# Patient Record
Sex: FEMALE | Race: WHITE | Employment: FULL TIME | URBAN - NONMETROPOLITAN AREA
[De-identification: names, ages, dates, MRNs, and addresses within clinical notes are randomized per-mention and may not be internally consistent; named-entity substitution may affect disease eponyms.]

---

## 2017-05-12 ENCOUNTER — HOSPITAL ENCOUNTER (OUTPATIENT)
Age: 57
Discharge: HOME | End: 2017-05-12
Payer: COMMERCIAL

## 2017-05-12 DIAGNOSIS — E78.49 OTHER HYPERLIPIDEMIA: ICD-10-CM

## 2017-05-12 DIAGNOSIS — R73.9 HYPERGLYCEMIA: ICD-10-CM

## 2017-05-12 LAB
ALT SERPL-CCNC: 15 U/L (ref 5–33)
ANION GAP SERPL CALCULATED.3IONS-SCNC: 11 MMOL/L (ref 9–17)
AST SERPL-CCNC: 16 U/L
BUN BLDV-MCNC: 21 MG/DL (ref 6–20)
BUN/CREAT BLD: 22 (ref 9–20)
CALCIUM SERPL-MCNC: 9.9 MG/DL (ref 8.6–10.4)
CHLORIDE BLD-SCNC: 101 MMOL/L (ref 98–107)
CHOLESTEROL/HDL RATIO: 3.4
CHOLESTEROL: 162 MG/DL
CO2: 28 MMOL/L (ref 20–31)
CREAT SERPL-MCNC: 0.95 MG/DL (ref 0.5–0.9)
ESTIMATED AVERAGE GLUCOSE: 114 MG/DL
GFR AFRICAN AMERICAN: >60 ML/MIN
GFR NON-AFRICAN AMERICAN: >60 ML/MIN
GFR SERPL CREATININE-BSD FRML MDRD: ABNORMAL ML/MIN/{1.73_M2}
GFR SERPL CREATININE-BSD FRML MDRD: ABNORMAL ML/MIN/{1.73_M2}
GLUCOSE BLD-MCNC: 111 MG/DL (ref 70–99)
HBA1C MFR BLD: 5.6 % (ref 4.8–5.9)
HCT VFR BLD CALC: 42.7 % (ref 36–46)
HDLC SERPL-MCNC: 48 MG/DL
HEMOGLOBIN: 14.5 G/DL (ref 12–16)
HIGH SENSITIVE C-REACTIVE PROTEIN: 0.5 MG/L
LDL CHOLESTEROL: 95 MG/DL (ref 0–130)
MCH RBC QN AUTO: 30.6 PG (ref 26–34)
MCHC RBC AUTO-ENTMCNC: 34 G/DL (ref 31–37)
MCV RBC AUTO: 90 FL (ref 80–100)
PDW BLD-RTO: 12.5 % (ref 12.1–15.2)
PLATELET # BLD: 190 K/UL (ref 140–450)
PMV BLD AUTO: NORMAL FL (ref 6–12)
POTASSIUM SERPL-SCNC: 4.3 MMOL/L (ref 3.7–5.3)
RBC # BLD: 4.75 M/UL (ref 4–5.2)
SODIUM BLD-SCNC: 140 MMOL/L (ref 135–144)
TRIGL SERPL-MCNC: 93 MG/DL
VLDLC SERPL CALC-MCNC: NORMAL MG/DL (ref 1–30)
WBC # BLD: 4.9 K/UL (ref 3.5–11)

## 2017-05-12 PROCEDURE — 80048 BASIC METABOLIC PNL TOTAL CA: CPT

## 2017-05-12 PROCEDURE — 86141 C-REACTIVE PROTEIN HS: CPT

## 2017-05-12 PROCEDURE — 80061 LIPID PANEL: CPT

## 2017-05-12 PROCEDURE — 83036 HEMOGLOBIN GLYCOSYLATED A1C: CPT

## 2017-05-12 PROCEDURE — 84450 TRANSFERASE (AST) (SGOT): CPT

## 2017-05-12 PROCEDURE — 85027 COMPLETE CBC AUTOMATED: CPT

## 2017-05-12 PROCEDURE — 36415 COLL VENOUS BLD VENIPUNCTURE: CPT

## 2017-05-12 PROCEDURE — 84460 ALANINE AMINO (ALT) (SGPT): CPT

## 2017-05-19 PROBLEM — M17.12 OSTEOARTHRITIS OF LEFT KNEE: Status: ACTIVE | Noted: 2017-05-19

## 2017-05-19 PROBLEM — I10 ESSENTIAL HYPERTENSION: Status: ACTIVE | Noted: 2017-05-19

## 2017-05-19 PROBLEM — B35.1 ONYCHOMYCOSIS: Status: ACTIVE | Noted: 2017-05-19

## 2017-11-22 ENCOUNTER — HOSPITAL ENCOUNTER (OUTPATIENT)
Age: 57
Discharge: HOME | End: 2017-11-22
Payer: COMMERCIAL

## 2017-11-22 DIAGNOSIS — E78.5 HYPERLIPIDEMIA, UNSPECIFIED: ICD-10-CM

## 2017-11-22 DIAGNOSIS — R73.9 HYPERGLYCEMIA: ICD-10-CM

## 2017-11-22 LAB
ANION GAP SERPL CALCULATED.3IONS-SCNC: 11 MMOL/L (ref 9–17)
BUN BLDV-MCNC: 20 MG/DL (ref 6–20)
BUN/CREAT BLD: 17 (ref 9–20)
CALCIUM SERPL-MCNC: 9.9 MG/DL (ref 8.6–10.4)
CHLORIDE BLD-SCNC: 104 MMOL/L (ref 98–107)
CHOLESTEROL/HDL RATIO: 3.9
CHOLESTEROL: 217 MG/DL
CO2: 28 MMOL/L (ref 20–31)
CREAT SERPL-MCNC: 1.16 MG/DL (ref 0.5–0.9)
CREATININE URINE: 154.6 MG/DL (ref 28–217)
ESTIMATED AVERAGE GLUCOSE: 105 MG/DL
GFR AFRICAN AMERICAN: 58 ML/MIN
GFR NON-AFRICAN AMERICAN: 48 ML/MIN
GFR SERPL CREATININE-BSD FRML MDRD: ABNORMAL ML/MIN/{1.73_M2}
GFR SERPL CREATININE-BSD FRML MDRD: ABNORMAL ML/MIN/{1.73_M2}
GLUCOSE BLD-MCNC: 119 MG/DL (ref 70–99)
HBA1C MFR BLD: 5.3 % (ref 4.8–5.9)
HCT VFR BLD CALC: 43.5 % (ref 36–46)
HDLC SERPL-MCNC: 56 MG/DL
HEMOGLOBIN: 14.8 G/DL (ref 12–16)
HIGH SENSITIVE C-REACTIVE PROTEIN: 0.6 MG/L
LDL CHOLESTEROL: 137 MG/DL (ref 0–130)
MCH RBC QN AUTO: 30.7 PG (ref 26–34)
MCHC RBC AUTO-ENTMCNC: 33.9 G/DL (ref 31–37)
MCV RBC AUTO: 90.4 FL (ref 80–100)
MICROALBUMIN/CREAT 24H UR: 31 MG/L
MICROALBUMIN/CREAT UR-RTO: 20 MCG/MG CREAT
PDW BLD-RTO: 12.4 % (ref 12.1–15.2)
PLATELET # BLD: 200 K/UL (ref 140–450)
PMV BLD AUTO: 9.3 FL (ref 6–12)
POTASSIUM SERPL-SCNC: 4.5 MMOL/L (ref 3.7–5.3)
RBC # BLD: 4.82 M/UL (ref 4–5.2)
SODIUM BLD-SCNC: 143 MMOL/L (ref 135–144)
TRIGL SERPL-MCNC: 121 MG/DL
VLDLC SERPL CALC-MCNC: ABNORMAL MG/DL (ref 1–30)
WBC # BLD: 5.6 K/UL (ref 3.5–11)

## 2017-11-22 PROCEDURE — 82570 ASSAY OF URINE CREATININE: CPT

## 2017-11-22 PROCEDURE — 82043 UR ALBUMIN QUANTITATIVE: CPT

## 2017-11-22 PROCEDURE — 85027 COMPLETE CBC AUTOMATED: CPT

## 2017-11-22 PROCEDURE — 86141 C-REACTIVE PROTEIN HS: CPT

## 2017-11-22 PROCEDURE — 83036 HEMOGLOBIN GLYCOSYLATED A1C: CPT

## 2017-11-22 PROCEDURE — 36415 COLL VENOUS BLD VENIPUNCTURE: CPT

## 2017-11-22 PROCEDURE — 80061 LIPID PANEL: CPT

## 2017-11-22 PROCEDURE — 80048 BASIC METABOLIC PNL TOTAL CA: CPT

## 2017-11-28 ENCOUNTER — OFFICE VISIT (OUTPATIENT)
Dept: FAMILY MEDICINE CLINIC | Age: 57
End: 2017-11-28
Payer: COMMERCIAL

## 2017-11-28 VITALS — DIASTOLIC BLOOD PRESSURE: 76 MMHG | WEIGHT: 183.8 LBS | BODY MASS INDEX: 29.67 KG/M2 | SYSTOLIC BLOOD PRESSURE: 140 MMHG

## 2017-11-28 DIAGNOSIS — M85.80 OSTEOPENIA, UNSPECIFIED LOCATION: ICD-10-CM

## 2017-11-28 DIAGNOSIS — I10 ESSENTIAL HYPERTENSION: Primary | ICD-10-CM

## 2017-11-28 DIAGNOSIS — R73.9 HYPERGLYCEMIA: ICD-10-CM

## 2017-11-28 DIAGNOSIS — N18.30 CHRONIC RENAL FAILURE, STAGE 3 (MODERATE) (HCC): ICD-10-CM

## 2017-11-28 DIAGNOSIS — E78.5 HYPERLIPIDEMIA, UNSPECIFIED HYPERLIPIDEMIA TYPE: ICD-10-CM

## 2017-11-28 PROCEDURE — 99214 OFFICE O/P EST MOD 30 MIN: CPT | Performed by: FAMILY MEDICINE

## 2017-11-28 NOTE — PROGRESS NOTES
[address]  [place] [id number]  Dept: [facility] [address] is a 59 y.o. female here for 6 Month Follow-Up; Hypertension; and Hyperglycemia      HPI:  HYPERGLYCEMIA:  Diet compliance: compliant all of the time  Current exercise: none but tries to not sit for long periods of time     HYPERLIPIDEMIA   Medication compliance: compliant all of the time. Patient is following a low fat, low cholesterol diet. She is not exercising regularly.       HYPERTENSION  She is exercising and is adherent to a low-salt diet. Blood pressure is not being monitored at home, average readings are n/a. . Cardiac symptoms: none. Patient denies: chest pain, dyspnea and palpitations.      GERD  Jake has a history of heartburn. Current medical therapy includes Pepcid and tums. Aggravating factors include pizza, chocolate, caffeine  Prior to Admission medications    Medication Sig Start Date End Date Taking?  Authorizing Provider   metFORMIN (GLUCOPHAGE-XR) 500 MG extended release tablet TAKE 1 TABLET DAILY WITH BREAKFAST 8/14/17  Yes Johanne Hauser MD   bisoprolol-hydrochlorothiazide [facility]) 2.5-6.25 MG per tablet TAKE 1 TABLET DAILY 8/14/17  Yes Johanne Hauser MD   potassium citrate (UROCIT-K) 10 MEQ (1080 MG) extended release tablet TAKE 1 TABLET THREE TIMES A DAY WITH MEALS 8/7/17  Yes Johanne Hauser MD   pravastatin (PRAVACHOL) 40 MG tablet TAKE 1 TABLET DAILY 3/17/17  Yes Johanne Hauser MD   aspirin 81 MG tablet Take 81 mg by mouth daily   Yes Historical Provider, MD   Calcium Carbonate (CALCIUM 600 PO) Take by mouth   Yes Historical Provider, MD   calcium carbonate (TUMS) 500 MG chewable tablet Take 1 tablet by mouth daily   Yes Historical Provider, MD   Multiple Vitamins-Minerals (THERAPEUTIC MULTIVITAMIN-MINERALS) tablet Take 1 tablet by mouth daily   Yes Historical Provider, MD   famotidine (PEPCID) 20 MG tablet Take 1 tablet by mouth 2 times daily  Patient taking differently: Take 40 mg by mouth daily  5/13/15  Yes Shanti Kruger MD   Cholecalciferol (VITAMIN D) 2000 UNITS CAPS capsule Take 2,000 Units by mouth daily  Patient taking differently: Take 1,000 Units by mouth daily  5/13/15  Yes Shanti Kruger MD   EPINEPHrine HCl, Anaphylaxis, (EPIPEN IM) Inject into the muscle    Historical Provider, MD       ROS:  General Constitutional: Denies chills. Denies fever. Denies headache. Denies lightheadedness. Ophthalmologic: Denies blurred vision. ENT: admits nasal congestion. Denies sore throat. Denies ear pain and pressure. Respiratory: admits cough. Denies shortness of breath. Denies wheezing. Cardiovascular: Denies chest pain at rest. Denies irregular heartbeat. Denies palpitations. Gastrointestinal: Denies abdominal pain. Denies blood in the stool. Denies constipation. Denies diarrhea. Denies nausea. Denies vomiting. Genitourinary: Denies blood in the urine. Denies difficulty urinating. Denies frequent urination. Denies painful urination. Denies urinary incontinence  Musculoskeletal: Denies muscle aches. Denies painful joints. Denies swollen joints. Peripheral Vascular: Denies pain/cramping in legs after exertion. Skin: admits dry skin. Denies itching. Denies rash. Neurologic: Denies falls. Denies dizziness. Denies fainting. Denies tingling/numbness. Psychiatric: Denies sleep disturbance. Denies anxiety. Denies depressed mood.     Past Surgical History:   Procedure Laterality Date     SECTION      COLONOSCOPY      Dr. Cheryle Mis CYSTOSCOPY  2008    R stent, ESWL    HYSTERECTOMY  2004       Family History   Problem Relation Age of Onset   [address] [facility] Cancer Father      melanoma    High Cholesterol Mother     Other Mother      osteoporosis    Cancer Brother      Melanoma       Past Medical History:   Diagnosis Date    CRF (chronic renal failure)     Hyperglycemia     Hyperlipidemia     Hypertension     MVP (mitral valve prolapse)     Obesity    

## 2018-03-12 RX ORDER — PRAVASTATIN SODIUM 40 MG
TABLET ORAL
Qty: 90 TABLET | Refills: 3 | Status: SHIPPED | OUTPATIENT
Start: 2018-03-12 | End: 2019-03-07 | Stop reason: SDUPTHER

## 2018-05-22 ENCOUNTER — HOSPITAL ENCOUNTER (OUTPATIENT)
Age: 58
Discharge: HOME | End: 2018-05-22
Payer: COMMERCIAL

## 2018-05-22 DIAGNOSIS — M85.80 OSTEOPENIA, UNSPECIFIED LOCATION: ICD-10-CM

## 2018-05-22 DIAGNOSIS — E78.5 HYPERLIPIDEMIA, UNSPECIFIED HYPERLIPIDEMIA TYPE: ICD-10-CM

## 2018-05-22 DIAGNOSIS — R73.9 HYPERGLYCEMIA: ICD-10-CM

## 2018-05-22 LAB
ALT SERPL-CCNC: 20 U/L (ref 5–33)
ANION GAP SERPL CALCULATED.3IONS-SCNC: 9 MMOL/L (ref 9–17)
AST SERPL-CCNC: 16 U/L
BUN BLDV-MCNC: 16 MG/DL (ref 6–20)
BUN/CREAT BLD: 19 (ref 9–20)
CALCIUM SERPL-MCNC: 9.5 MG/DL (ref 8.6–10.4)
CHLORIDE BLD-SCNC: 104 MMOL/L (ref 98–107)
CO2: 29 MMOL/L (ref 20–31)
CREAT SERPL-MCNC: 0.86 MG/DL (ref 0.5–0.9)
ESTIMATED AVERAGE GLUCOSE: 120 MG/DL
GFR AFRICAN AMERICAN: >60 ML/MIN
GFR NON-AFRICAN AMERICAN: >60 ML/MIN
GFR SERPL CREATININE-BSD FRML MDRD: ABNORMAL ML/MIN/{1.73_M2}
GFR SERPL CREATININE-BSD FRML MDRD: ABNORMAL ML/MIN/{1.73_M2}
GLUCOSE BLD-MCNC: 119 MG/DL (ref 70–99)
HBA1C MFR BLD: 5.8 % (ref 4.8–5.9)
HCT VFR BLD CALC: 43 % (ref 36.3–47.1)
HEMOGLOBIN: 14.5 G/DL (ref 11.9–15.1)
MCH RBC QN AUTO: 31.3 PG (ref 25.2–33.5)
MCHC RBC AUTO-ENTMCNC: 33.7 G/DL (ref 28.4–34.8)
MCV RBC AUTO: 92.9 FL (ref 82.6–102.9)
NRBC AUTOMATED: 0 PER 100 WBC
PDW BLD-RTO: 11.9 % (ref 11.8–14.4)
PLATELET # BLD: 215 K/UL (ref 138–453)
PMV BLD AUTO: 10.4 FL (ref 8.1–13.5)
POTASSIUM SERPL-SCNC: 4.8 MMOL/L (ref 3.7–5.3)
RBC # BLD: 4.63 M/UL (ref 3.95–5.11)
SODIUM BLD-SCNC: 142 MMOL/L (ref 135–144)
VITAMIN D 25-HYDROXY: 29.6 NG/ML (ref 30–100)
WBC # BLD: 5.2 K/UL (ref 3.5–11.3)

## 2018-05-22 PROCEDURE — 80048 BASIC METABOLIC PNL TOTAL CA: CPT

## 2018-05-22 PROCEDURE — 82306 VITAMIN D 25 HYDROXY: CPT

## 2018-05-22 PROCEDURE — 85027 COMPLETE CBC AUTOMATED: CPT

## 2018-05-22 PROCEDURE — 84450 TRANSFERASE (AST) (SGOT): CPT

## 2018-05-22 PROCEDURE — 83036 HEMOGLOBIN GLYCOSYLATED A1C: CPT

## 2018-05-22 PROCEDURE — 36415 COLL VENOUS BLD VENIPUNCTURE: CPT

## 2018-05-22 PROCEDURE — 84460 ALANINE AMINO (ALT) (SGPT): CPT

## 2018-05-29 ENCOUNTER — OFFICE VISIT (OUTPATIENT)
Dept: FAMILY MEDICINE CLINIC | Age: 58
End: 2018-05-29
Payer: COMMERCIAL

## 2018-05-29 VITALS
WEIGHT: 188 LBS | HEIGHT: 66 IN | BODY MASS INDEX: 30.22 KG/M2 | DIASTOLIC BLOOD PRESSURE: 74 MMHG | SYSTOLIC BLOOD PRESSURE: 118 MMHG

## 2018-05-29 DIAGNOSIS — I10 ESSENTIAL HYPERTENSION: ICD-10-CM

## 2018-05-29 DIAGNOSIS — E78.5 HYPERLIPIDEMIA, UNSPECIFIED HYPERLIPIDEMIA TYPE: ICD-10-CM

## 2018-05-29 DIAGNOSIS — M19.012 OSTEOARTHRITIS OF LEFT SHOULDER, UNSPECIFIED OSTEOARTHRITIS TYPE: ICD-10-CM

## 2018-05-29 DIAGNOSIS — R73.9 HYPERGLYCEMIA: Primary | ICD-10-CM

## 2018-05-29 PROCEDURE — 99214 OFFICE O/P EST MOD 30 MIN: CPT | Performed by: FAMILY MEDICINE

## 2018-05-29 ASSESSMENT — PATIENT HEALTH QUESTIONNAIRE - PHQ9
2. FEELING DOWN, DEPRESSED OR HOPELESS: 0
SUM OF ALL RESPONSES TO PHQ QUESTIONS 1-9: 0
SUM OF ALL RESPONSES TO PHQ9 QUESTIONS 1 & 2: 0
1. LITTLE INTEREST OR PLEASURE IN DOING THINGS: 0

## 2018-08-13 RX ORDER — BISOPROLOL FUMARATE AND HYDROCHLOROTHIAZIDE 2.5; 6.25 MG/1; MG/1
TABLET ORAL
Qty: 90 TABLET | Refills: 3 | Status: SHIPPED | OUTPATIENT
Start: 2018-08-13 | End: 2019-09-26 | Stop reason: SDUPTHER

## 2018-08-13 RX ORDER — METFORMIN HYDROCHLORIDE 500 MG/1
TABLET, EXTENDED RELEASE ORAL
Qty: 90 TABLET | Refills: 3 | Status: SHIPPED | OUTPATIENT
Start: 2018-08-13 | End: 2020-07-01 | Stop reason: SDUPTHER

## 2018-11-23 ENCOUNTER — HOSPITAL ENCOUNTER (OUTPATIENT)
Age: 58
Discharge: HOME | End: 2018-11-23
Payer: COMMERCIAL

## 2018-11-23 DIAGNOSIS — E78.5 HYPERLIPIDEMIA, UNSPECIFIED HYPERLIPIDEMIA TYPE: ICD-10-CM

## 2018-11-23 DIAGNOSIS — R73.9 HYPERGLYCEMIA: ICD-10-CM

## 2018-11-23 LAB
ANION GAP SERPL CALCULATED.3IONS-SCNC: 11 MMOL/L (ref 9–17)
BUN BLDV-MCNC: 16 MG/DL (ref 8–23)
BUN/CREAT BLD: 16 (ref 9–20)
CALCIUM SERPL-MCNC: 9.7 MG/DL (ref 8.6–10.4)
CHLORIDE BLD-SCNC: 102 MMOL/L (ref 98–107)
CO2: 28 MMOL/L (ref 20–31)
CREAT SERPL-MCNC: 1.01 MG/DL (ref 0.5–0.9)
ESTIMATED AVERAGE GLUCOSE: 114 MG/DL
GFR AFRICAN AMERICAN: >60 ML/MIN
GFR NON-AFRICAN AMERICAN: 56 ML/MIN
GFR SERPL CREATININE-BSD FRML MDRD: ABNORMAL ML/MIN/{1.73_M2}
GFR SERPL CREATININE-BSD FRML MDRD: ABNORMAL ML/MIN/{1.73_M2}
GLUCOSE BLD-MCNC: 118 MG/DL (ref 70–99)
HBA1C MFR BLD: 5.6 % (ref 4.8–5.9)
POTASSIUM SERPL-SCNC: 4.2 MMOL/L (ref 3.7–5.3)
SODIUM BLD-SCNC: 141 MMOL/L (ref 135–144)

## 2018-11-23 PROCEDURE — 36415 COLL VENOUS BLD VENIPUNCTURE: CPT

## 2018-11-23 PROCEDURE — 80048 BASIC METABOLIC PNL TOTAL CA: CPT

## 2018-11-23 PROCEDURE — 83036 HEMOGLOBIN GLYCOSYLATED A1C: CPT

## 2018-11-27 ENCOUNTER — OFFICE VISIT (OUTPATIENT)
Dept: FAMILY MEDICINE CLINIC | Age: 58
End: 2018-11-27
Payer: COMMERCIAL

## 2018-11-27 VITALS
BODY MASS INDEX: 29.73 KG/M2 | WEIGHT: 185 LBS | SYSTOLIC BLOOD PRESSURE: 122 MMHG | HEIGHT: 66 IN | DIASTOLIC BLOOD PRESSURE: 72 MMHG

## 2018-11-27 DIAGNOSIS — K21.9 GASTROESOPHAGEAL REFLUX DISEASE, ESOPHAGITIS PRESENCE NOT SPECIFIED: ICD-10-CM

## 2018-11-27 DIAGNOSIS — E78.5 HYPERLIPIDEMIA, UNSPECIFIED HYPERLIPIDEMIA TYPE: ICD-10-CM

## 2018-11-27 DIAGNOSIS — M85.80 OSTEOPENIA, UNSPECIFIED LOCATION: ICD-10-CM

## 2018-11-27 DIAGNOSIS — I10 ESSENTIAL HYPERTENSION: ICD-10-CM

## 2018-11-27 DIAGNOSIS — N18.30 CHRONIC RENAL FAILURE, STAGE 3 (MODERATE) (HCC): ICD-10-CM

## 2018-11-27 DIAGNOSIS — R73.9 HYPERGLYCEMIA: Primary | ICD-10-CM

## 2018-11-27 DIAGNOSIS — Z12.31 SCREENING MAMMOGRAM, ENCOUNTER FOR: ICD-10-CM

## 2018-11-27 DIAGNOSIS — Z23 NEED FOR PROPHYLACTIC VACCINATION AND INOCULATION AGAINST INFLUENZA: ICD-10-CM

## 2018-11-27 PROCEDURE — 99214 OFFICE O/P EST MOD 30 MIN: CPT | Performed by: FAMILY MEDICINE

## 2018-11-27 PROCEDURE — 90471 IMMUNIZATION ADMIN: CPT | Performed by: FAMILY MEDICINE

## 2018-11-27 PROCEDURE — 90688 IIV4 VACCINE SPLT 0.5 ML IM: CPT | Performed by: FAMILY MEDICINE

## 2018-11-27 NOTE — PROGRESS NOTES
Past Medical History:   Diagnosis Date    CRF (chronic renal failure)     Hyperglycemia     Hyperlipidemia     Hypertension     MVP (mitral valve prolapse)     Obesity     Renal calculi 2008    bilat      Social History   Substance Use Topics    Smoking status: Never Smoker    Smokeless tobacco: Never Used    Alcohol use Not on file      Current Outpatient Prescriptions   Medication Sig Dispense Refill    metFORMIN (GLUCOPHAGE-XR) 500 MG extended release tablet TAKE 1 TABLET DAILY WITH BREAKFAST 90 tablet 3    bisoprolol-hydrochlorothiazide (ZIAC) 2.5-6.25 MG per tablet TAKE 1 TABLET DAILY 90 tablet 3    pravastatin (PRAVACHOL) 40 MG tablet TAKE 1 TABLET DAILY 90 tablet 3    potassium citrate (UROCIT-K) 10 MEQ (1080 MG) extended release tablet TAKE 1 TABLET THREE TIMES A DAY WITH MEALS (Patient taking differently: TAKE 1 TABLET once daily) 270 tablet 2    aspirin 81 MG tablet Take 81 mg by mouth daily      Calcium Carbonate (CALCIUM 600 PO) Take by mouth      calcium carbonate (TUMS) 500 MG chewable tablet Take 1 tablet by mouth daily      Multiple Vitamins-Minerals (THERAPEUTIC MULTIVITAMIN-MINERALS) tablet Take 1 tablet by mouth daily      famotidine (PEPCID) 20 MG tablet Take 1 tablet by mouth 2 times daily (Patient taking differently: Take 40 mg by mouth daily ) 180 tablet 3    Cholecalciferol (VITAMIN D) 2000 UNITS CAPS capsule Take 2,000 Units by mouth daily (Patient taking differently: Take 1,000 Units by mouth daily ) 90 capsule 3    EPINEPHrine HCl, Anaphylaxis, (EPIPEN IM) Inject into the muscle       No current facility-administered medications for this visit.       No Known Allergies    PHYSICAL EXAM:    /72 (Site: Left Upper Arm, Position: Sitting, Cuff Size: Medium Adult)   Ht 5' 6\" (1.676 m)   Wt 185 lb (83.9 kg)   BMI 29.86 kg/m²   Wt Readings from Last 3 Encounters:   11/27/18 185 lb (83.9 kg)   05/29/18 188 lb (85.3 kg)   11/28/17 183 lb 12.8 oz (83.4 kg)     BP

## 2018-11-27 NOTE — PATIENT INSTRUCTIONS
hospital. Otherwise, call your doctor. · Reactions should be reported to the \"Vaccine Adverse Event Reporting System\" (VAERS). Your doctor should file this report, or you can do it yourself through the VAERS website at www.vaers. [facility].gov, or by calling 4-407.854.4180. VAERS does not give medical advice. The National Vaccine Injury Compensation Program  The National Vaccine Injury Compensation Program (VICP) is a federal program that was created to compensate people who may have been injured by certain vaccines. Persons who believe they may have been injured by a vaccine can learn about the program and about filing a claim by calling 3-194.698.1091 or visiting the [employer] website at www.[facility].gov/vaccinecompensation. There is a time limit to file a claim for compensation. How can I learn more? · Ask your healthcare provider. He or she can give you the vaccine package insert or suggest other sources of information. · Call your local or state health department. · Contact the Centers for Disease Control and Prevention (CDC):  ? Call 9-880.848.9075 (1-800-CDC-INFO) or  ? Visit CDC's website at www.cdc.gov/flu  Vaccine Information Statement  Inactivated Influenza Vaccine  8/7/2015)  42 DIDIER Chang Joana [id number]  Department of Health and Human Services  Centers for Disease Control and Prevention  Many Vaccine Information Statements are available in [place] and other languages. See www.immunize.org/vis. Muchas hojas de información sobre vacunas están disponibles en español y en otros idiomas. Visite www.immunize.org/vis. Care instructions adapted under license by [facility] ([facility]). If you have questions about a medical condition or this instruction, always ask your healthcare professional. [address] any warranty or liability for your use of this information.

## 2018-12-27 PROBLEM — Z12.31 SCREENING MAMMOGRAM, ENCOUNTER FOR: Status: RESOLVED | Noted: 2018-11-27 | Resolved: 2018-12-27

## 2019-03-07 RX ORDER — PRAVASTATIN SODIUM 40 MG
TABLET ORAL
Qty: 90 TABLET | Refills: 3 | Status: SHIPPED | OUTPATIENT
Start: 2019-03-07 | End: 2019-12-06 | Stop reason: SDUPTHER

## 2019-05-24 ENCOUNTER — HOSPITAL ENCOUNTER (OUTPATIENT)
Age: 59
Discharge: HOME | End: 2019-05-24
Payer: COMMERCIAL

## 2019-05-24 DIAGNOSIS — M85.80 OSTEOPENIA, UNSPECIFIED LOCATION: ICD-10-CM

## 2019-05-24 DIAGNOSIS — R73.9 HYPERGLYCEMIA: ICD-10-CM

## 2019-05-24 DIAGNOSIS — E78.5 HYPERLIPIDEMIA, UNSPECIFIED HYPERLIPIDEMIA TYPE: ICD-10-CM

## 2019-05-24 LAB
ALT SERPL-CCNC: 34 U/L (ref 5–33)
ANION GAP SERPL CALCULATED.3IONS-SCNC: 8 MMOL/L (ref 9–17)
AST SERPL-CCNC: 25 U/L
BUN BLDV-MCNC: 13 MG/DL (ref 8–23)
BUN/CREAT BLD: 14 (ref 9–20)
CALCIUM SERPL-MCNC: 9.5 MG/DL (ref 8.6–10.4)
CHLORIDE BLD-SCNC: 102 MMOL/L (ref 98–107)
CHOLESTEROL/HDL RATIO: 3.5
CHOLESTEROL: 202 MG/DL
CO2: 31 MMOL/L (ref 20–31)
CREAT SERPL-MCNC: 0.92 MG/DL (ref 0.5–0.9)
CREATININE URINE: 42.7 MG/DL (ref 28–217)
ESTIMATED AVERAGE GLUCOSE: 114 MG/DL
GFR AFRICAN AMERICAN: >60 ML/MIN
GFR NON-AFRICAN AMERICAN: >60 ML/MIN
GFR SERPL CREATININE-BSD FRML MDRD: ABNORMAL ML/MIN/{1.73_M2}
GFR SERPL CREATININE-BSD FRML MDRD: ABNORMAL ML/MIN/{1.73_M2}
GLUCOSE BLD-MCNC: 105 MG/DL (ref 70–99)
HBA1C MFR BLD: 5.6 % (ref 4.8–5.9)
HCT VFR BLD CALC: 42.3 % (ref 36.3–47.1)
HDLC SERPL-MCNC: 58 MG/DL
HEMOGLOBIN: 14 G/DL (ref 11.9–15.1)
HIGH SENSITIVE C-REACTIVE PROTEIN: 0.6 MG/L
LDL CHOLESTEROL: 125 MG/DL (ref 0–130)
MCH RBC QN AUTO: 30.8 PG (ref 25.2–33.5)
MCHC RBC AUTO-ENTMCNC: 33.1 G/DL (ref 28.4–34.8)
MCV RBC AUTO: 93.2 FL (ref 82.6–102.9)
MICROALBUMIN/CREAT 24H UR: <12 MG/L
MICROALBUMIN/CREAT UR-RTO: NORMAL MCG/MG CREAT
NRBC AUTOMATED: 0 PER 100 WBC
PDW BLD-RTO: 12.3 % (ref 11.8–14.4)
PLATELET # BLD: 232 K/UL (ref 138–453)
PMV BLD AUTO: 11 FL (ref 8.1–13.5)
POTASSIUM SERPL-SCNC: 4.5 MMOL/L (ref 3.7–5.3)
RBC # BLD: 4.54 M/UL (ref 3.95–5.11)
SODIUM BLD-SCNC: 141 MMOL/L (ref 135–144)
TRIGL SERPL-MCNC: 97 MG/DL
VITAMIN D 25-HYDROXY: 32.2 NG/ML (ref 30–100)
VLDLC SERPL CALC-MCNC: ABNORMAL MG/DL (ref 1–30)
WBC # BLD: 5.7 K/UL (ref 3.5–11.3)

## 2019-05-24 PROCEDURE — 82570 ASSAY OF URINE CREATININE: CPT

## 2019-05-24 PROCEDURE — 84460 ALANINE AMINO (ALT) (SGPT): CPT

## 2019-05-24 PROCEDURE — 82043 UR ALBUMIN QUANTITATIVE: CPT

## 2019-05-24 PROCEDURE — 83036 HEMOGLOBIN GLYCOSYLATED A1C: CPT

## 2019-05-24 PROCEDURE — 80048 BASIC METABOLIC PNL TOTAL CA: CPT

## 2019-05-24 PROCEDURE — 36415 COLL VENOUS BLD VENIPUNCTURE: CPT

## 2019-05-24 PROCEDURE — 80061 LIPID PANEL: CPT

## 2019-05-24 PROCEDURE — 84450 TRANSFERASE (AST) (SGOT): CPT

## 2019-05-24 PROCEDURE — 82306 VITAMIN D 25 HYDROXY: CPT

## 2019-05-24 PROCEDURE — 86141 C-REACTIVE PROTEIN HS: CPT

## 2019-05-24 PROCEDURE — 85027 COMPLETE CBC AUTOMATED: CPT

## 2019-05-31 ENCOUNTER — OFFICE VISIT (OUTPATIENT)
Dept: FAMILY MEDICINE CLINIC | Age: 59
End: 2019-05-31
Payer: COMMERCIAL

## 2019-05-31 VITALS
DIASTOLIC BLOOD PRESSURE: 70 MMHG | HEIGHT: 66 IN | WEIGHT: 189.2 LBS | SYSTOLIC BLOOD PRESSURE: 132 MMHG | BODY MASS INDEX: 30.41 KG/M2

## 2019-05-31 DIAGNOSIS — I10 ESSENTIAL HYPERTENSION: ICD-10-CM

## 2019-05-31 DIAGNOSIS — N18.30 CHRONIC RENAL FAILURE, STAGE 3 (MODERATE) (HCC): ICD-10-CM

## 2019-05-31 DIAGNOSIS — E78.5 HYPERLIPIDEMIA, UNSPECIFIED HYPERLIPIDEMIA TYPE: ICD-10-CM

## 2019-05-31 DIAGNOSIS — J30.9 ALLERGIC RHINITIS, UNSPECIFIED SEASONALITY, UNSPECIFIED TRIGGER: ICD-10-CM

## 2019-05-31 DIAGNOSIS — J06.9 VIRAL URI: ICD-10-CM

## 2019-05-31 DIAGNOSIS — Z87.442 HISTORY OF RENAL STONE: ICD-10-CM

## 2019-05-31 DIAGNOSIS — R73.9 HYPERGLYCEMIA: Primary | ICD-10-CM

## 2019-05-31 PROCEDURE — 99214 OFFICE O/P EST MOD 30 MIN: CPT | Performed by: FAMILY MEDICINE

## 2019-05-31 ASSESSMENT — PATIENT HEALTH QUESTIONNAIRE - PHQ9
SUM OF ALL RESPONSES TO PHQ9 QUESTIONS 1 & 2: 0
SUM OF ALL RESPONSES TO PHQ QUESTIONS 1-9: 0
SUM OF ALL RESPONSES TO PHQ QUESTIONS 1-9: 0
2. FEELING DOWN, DEPRESSED OR HOPELESS: 0
1. LITTLE INTEREST OR PLEASURE IN DOING THINGS: 0

## 2019-05-31 NOTE — PROGRESS NOTES
famotidine (PEPCID) 20 MG tablet Take 1 tablet by mouth 2 times daily  Patient taking differently: Take 40 mg by mouth daily  5/13/15  Yes Raul Rosen MD   Cholecalciferol (VITAMIN D) 2000 UNITS CAPS capsule Take 2,000 Units by mouth daily  Patient taking differently: Take 1,000 Units by mouth daily  5/13/15  Yes Raul Rosen MD   EPINEPHrine HCl, Anaphylaxis, (EPIPEN IM) Inject into the muscle    Historical Provider, MD     ROS:  General Constitutional: Denies chills. Denies fever. Denies headache. Denies lightheadedness. Ophthalmologic: Denies blurred vision. ENT: Denies nasal congestion. Denies ear pain and pressure. Admits pharyngitis and post nasal drainage x 1 day. Respiratory: Denies cough. Denies shortness of breath. Denies wheezing. Cardiovascular: Denies chest pain at rest. Denies irregular heartbeat. Denies palpitations. Gastrointestinal: Denies abdominal pain. Denies blood in the stool. Denies constipation. Denies diarrhea. Denies nausea. Denies vomiting. Genitourinary: Denies blood in the urine. Denies difficulty urinating. Denies frequent urination. Denies painful urination. Denies urinary incontinence. Musculoskeletal: Denies muscle aches. Denies painful joints. Denies swollen joints. Peripheral Vascular: Denies pain/cramping in legs after exertion. Skin: Denies dry skin. Denies itching. Denies rash. Neurologic: Denies falls. Denies dizziness. Denies fainting. Denies tingling/numbness. Psychiatric: Denies sleep disturbance. Denies anxiety. Denies depressed mood.     Past Surgical History:   Procedure Laterality Date     SECTION      COLONOSCOPY      Dr. Bob Petty    CYSTOSCOPY  2008    R stent, ESWL    HYSTERECTOMY  2004     Family History   Problem Relation Age of Onset    Cancer Father         melanoma    High Cholesterol Mother     Other Mother         osteoporosis    Cancer Brother         Melanoma     Past Medical History:   Diagnosis Date    CRF (chronic renal failure)     Hyperglycemia     Hyperlipidemia     Hypertension     MVP (mitral valve prolapse)     Obesity     Renal calculi 2008    bilat      Social History     Tobacco Use    Smoking status: Never Smoker    Smokeless tobacco: Never Used   Substance Use Topics    Alcohol use: Not on file      Current Outpatient Medications   Medication Sig Dispense Refill    pravastatin (PRAVACHOL) 40 MG tablet TAKE 1 TABLET DAILY 90 tablet 3    potassium citrate (UROCIT-K) 10 MEQ (1080 MG) extended release tablet TAKE 1 TABLET THREE TIMES A DAY WITH MEALS 270 tablet 3    metFORMIN (GLUCOPHAGE-XR) 500 MG extended release tablet TAKE 1 TABLET DAILY WITH BREAKFAST 90 tablet 3    bisoprolol-hydrochlorothiazide (ZIAC) 2.5-6.25 MG per tablet TAKE 1 TABLET DAILY 90 tablet 3    aspirin 81 MG tablet Take 81 mg by mouth daily      Calcium Carbonate (CALCIUM 600 PO) Take by mouth      calcium carbonate (TUMS) 500 MG chewable tablet Take 1 tablet by mouth daily      Multiple Vitamins-Minerals (THERAPEUTIC MULTIVITAMIN-MINERALS) tablet Take 1 tablet by mouth daily      famotidine (PEPCID) 20 MG tablet Take 1 tablet by mouth 2 times daily (Patient taking differently: Take 40 mg by mouth daily ) 180 tablet 3    Cholecalciferol (VITAMIN D) 2000 UNITS CAPS capsule Take 2,000 Units by mouth daily (Patient taking differently: Take 1,000 Units by mouth daily ) 90 capsule 3    EPINEPHrine HCl, Anaphylaxis, (EPIPEN IM) Inject into the muscle       No current facility-administered medications for this visit.       No Known Allergies    PHYSICAL EXAM:    /70 (Site: Left Upper Arm, Position: Sitting, Cuff Size: Medium Adult)   Ht 5' 6\" (1.676 m)   Wt 189 lb 3.2 oz (85.8 kg)   BMI 30.54 kg/m²   Wt Readings from Last 3 Encounters:   05/31/19 189 lb 3.2 oz (85.8 kg)   11/27/18 185 lb (83.9 kg)   05/29/18 188 lb (85.3 kg)     BP Readings from Last 3 Encounters:   05/31/19 132/70   11/27/18 122/72   05/29/18 118/74 General Appearance: in no acute distress, well developed, well nourished. Eyes: pupils equal, round reactive to light and accommodation. Ears: normal canals, L TM retracted  Nose: nares patent, no lesions. Oral Cavity: mucosa moist.  Throat: clear. Neck/Thyroid: neck supple, full range of motion, no cervical lymphadenopathy, no thyromegaly or carotid bruits. Skin: warm and dry. No suspicious lesions. Heart: regular rate and rhythm. No murmurs. S1, S2 normal, no gallops. Rate 75  Lungs: clear to auscultation bilaterally. Abdomen: bowel sounds present, soft, nontender, nondistended, no masses or organomegaly. Musculoskeletal: normal, full range of motion in knees and hips, no swelling or tenderness. Extremities: no cyanosis or edema. Peripheral Pulses: 2+ throughout, symetric. Neurologic: nonfocal, motor strength normal upper and lower extremities, sensory exam intact. Psych: normal affect, speech fluent. ASSESSMENT:   Diagnosis Orders   1. Hyperglycemia  Hemoglobin A1C   2. Hyperlipidemia, unspecified hyperlipidemia type  ALT    AST    Basic Metabolic Panel    CRP,High Sensitivity    Lipid Panel   3. Essential hypertension     4. History of renal stone     5. Allergic rhinitis, unspecified seasonality, unspecified trigger     6. Chronic renal failure, stage 3 (moderate) ([facility])     7. Viral URI         PLAN:  Labs reviewed, I am pleased with these results. She has been keeping good control of her A1C over the past few years. Her kidney function has also improved. She should continue to focus on hydration and avoidance of NSAIDs. She is taking an 81 mg Aspirin, I suggest for her to stop taking this. The risk to benefit ratio is not great enough for her. I will see her back in 6 months.      Orders Placed This Encounter   Procedures    ALT     Standing Status:   Future     Standing Expiration Date:   5/30/2020    AST     Standing Status:   Future     Standing Expiration Date:   5/30/2020   [employer] Basic Metabolic Panel     Standing Status:   Future     Standing Expiration Date:   5/30/2020   Gerri Tolbert CRP,High Sensitivity     Standing Status:   Future     Standing Expiration Date:   5/30/2020    Hemoglobin A1C     Standing Status:   Future     Standing Expiration Date:   5/30/2020    Lipid Panel     Standing Status:   Future     Standing Expiration Date:   5/30/2020     Order Specific Question:   Is Patient Fasting?/# of Hours     Answer:   no fasting     No orders of the defined types were placed in this encounter. I, Dr. Zeb Del Toro, personally performed the services described in this documentation as scribed by TRUNG Fallon in my presence, and is both accurate and complete.

## 2019-05-31 NOTE — PATIENT INSTRUCTIONS
PLAN:  Labs reviewed, I am pleased with these results. She has been keeping good control of her A1C over the past few years. Her kidney function has also improved. She should continue to focus on hydration and avoidance of NSAIDs. She is taking an 81 mg Aspirin, I suggest for her to stop taking this. The risk to benefit ratio is not great enough for her. I will see her back in 6 months. SURVEY:    You may be receiving a survey from [employer] regarding your visit today. Please complete the survey to enable us to provide the highest quality of care to you and your family. If you cannot score us a very good on any question, please call the office to discuss how we could have made your experience a very good one. Thank you.

## 2019-09-27 RX ORDER — BISOPROLOL FUMARATE AND HYDROCHLOROTHIAZIDE 2.5; 6.25 MG/1; MG/1
1 TABLET ORAL DAILY
Qty: 90 TABLET | Refills: 3 | Status: SHIPPED | OUTPATIENT
Start: 2019-09-27 | End: 2020-10-06

## 2019-11-29 ENCOUNTER — HOSPITAL ENCOUNTER (OUTPATIENT)
Age: 59
Discharge: HOME | End: 2019-11-29
Payer: COMMERCIAL

## 2019-11-29 DIAGNOSIS — R73.9 HYPERGLYCEMIA: ICD-10-CM

## 2019-11-29 DIAGNOSIS — E78.5 HYPERLIPIDEMIA, UNSPECIFIED HYPERLIPIDEMIA TYPE: ICD-10-CM

## 2019-11-29 LAB
ALT SERPL-CCNC: 21 U/L (ref 5–33)
ANION GAP SERPL CALCULATED.3IONS-SCNC: 9 MMOL/L (ref 9–17)
AST SERPL-CCNC: 16 U/L
BUN BLDV-MCNC: 14 MG/DL (ref 8–23)
BUN/CREAT BLD: 15 (ref 9–20)
CALCIUM SERPL-MCNC: 9.5 MG/DL (ref 8.6–10.4)
CHLORIDE BLD-SCNC: 103 MMOL/L (ref 98–107)
CHOLESTEROL/HDL RATIO: 3.6
CHOLESTEROL: 172 MG/DL
CO2: 28 MMOL/L (ref 20–31)
CREAT SERPL-MCNC: 0.93 MG/DL (ref 0.5–0.9)
ESTIMATED AVERAGE GLUCOSE: 120 MG/DL
GFR AFRICAN AMERICAN: >60 ML/MIN
GFR NON-AFRICAN AMERICAN: >60 ML/MIN
GFR SERPL CREATININE-BSD FRML MDRD: ABNORMAL ML/MIN/{1.73_M2}
GFR SERPL CREATININE-BSD FRML MDRD: ABNORMAL ML/MIN/{1.73_M2}
GLUCOSE BLD-MCNC: 100 MG/DL (ref 70–99)
HBA1C MFR BLD: 5.8 % (ref 4.8–5.9)
HDLC SERPL-MCNC: 48 MG/DL
LDL CHOLESTEROL: 97 MG/DL (ref 0–130)
POTASSIUM SERPL-SCNC: 4.2 MMOL/L (ref 3.7–5.3)
SODIUM BLD-SCNC: 140 MMOL/L (ref 135–144)
TRIGL SERPL-MCNC: 135 MG/DL
VLDLC SERPL CALC-MCNC: NORMAL MG/DL (ref 1–30)

## 2019-11-29 PROCEDURE — 83036 HEMOGLOBIN GLYCOSYLATED A1C: CPT

## 2019-11-29 PROCEDURE — 86141 C-REACTIVE PROTEIN HS: CPT

## 2019-11-29 PROCEDURE — 36415 COLL VENOUS BLD VENIPUNCTURE: CPT

## 2019-11-29 PROCEDURE — 80061 LIPID PANEL: CPT

## 2019-11-29 PROCEDURE — 80048 BASIC METABOLIC PNL TOTAL CA: CPT

## 2019-11-29 PROCEDURE — 84460 ALANINE AMINO (ALT) (SGPT): CPT

## 2019-11-29 PROCEDURE — 84450 TRANSFERASE (AST) (SGOT): CPT

## 2019-11-30 LAB — HIGH SENSITIVE C-REACTIVE PROTEIN: 0.8 MG/L

## 2019-12-06 ENCOUNTER — OFFICE VISIT (OUTPATIENT)
Dept: FAMILY MEDICINE CLINIC | Age: 59
End: 2019-12-06
Payer: COMMERCIAL

## 2019-12-06 VITALS
DIASTOLIC BLOOD PRESSURE: 80 MMHG | HEIGHT: 66 IN | WEIGHT: 193.6 LBS | SYSTOLIC BLOOD PRESSURE: 124 MMHG | BODY MASS INDEX: 31.12 KG/M2

## 2019-12-06 DIAGNOSIS — Z12.31 SCREENING MAMMOGRAM, ENCOUNTER FOR: ICD-10-CM

## 2019-12-06 DIAGNOSIS — R73.9 HYPERGLYCEMIA: ICD-10-CM

## 2019-12-06 DIAGNOSIS — Z87.442 HISTORY OF KIDNEY STONES: ICD-10-CM

## 2019-12-06 DIAGNOSIS — E78.5 HYPERLIPIDEMIA, UNSPECIFIED HYPERLIPIDEMIA TYPE: ICD-10-CM

## 2019-12-06 DIAGNOSIS — I10 ESSENTIAL HYPERTENSION: Primary | ICD-10-CM

## 2019-12-06 PROCEDURE — 99214 OFFICE O/P EST MOD 30 MIN: CPT | Performed by: FAMILY MEDICINE

## 2019-12-06 RX ORDER — PRAVASTATIN SODIUM 40 MG
TABLET ORAL
Qty: 30 TABLET | Refills: 5 | Status: SHIPPED | OUTPATIENT
Start: 2019-12-06 | End: 2020-06-08

## 2020-02-05 ENCOUNTER — HOSPITAL ENCOUNTER (OUTPATIENT)
Dept: WOMENS IMAGING | Age: 60
Discharge: HOME | End: 2020-02-07
Payer: COMMERCIAL

## 2020-02-05 PROCEDURE — 77063 BREAST TOMOSYNTHESIS BI: CPT

## 2020-05-29 ENCOUNTER — HOSPITAL ENCOUNTER (OUTPATIENT)
Age: 60
Discharge: HOME | End: 2020-05-29
Payer: COMMERCIAL

## 2020-05-29 LAB
ALT SERPL-CCNC: 18 U/L (ref 5–33)
ANION GAP SERPL CALCULATED.3IONS-SCNC: 9 MMOL/L (ref 9–17)
AST SERPL-CCNC: 15 U/L
BUN BLDV-MCNC: 18 MG/DL (ref 8–23)
BUN/CREAT BLD: 18 (ref 9–20)
CALCIUM SERPL-MCNC: 9.3 MG/DL (ref 8.6–10.4)
CHLORIDE BLD-SCNC: 103 MMOL/L (ref 98–107)
CO2: 26 MMOL/L (ref 20–31)
CREAT SERPL-MCNC: 0.99 MG/DL (ref 0.5–0.9)
ESTIMATED AVERAGE GLUCOSE: 111 MG/DL
GFR AFRICAN AMERICAN: >60 ML/MIN
GFR NON-AFRICAN AMERICAN: 57 ML/MIN
GFR SERPL CREATININE-BSD FRML MDRD: ABNORMAL ML/MIN/{1.73_M2}
GFR SERPL CREATININE-BSD FRML MDRD: ABNORMAL ML/MIN/{1.73_M2}
GLUCOSE BLD-MCNC: 131 MG/DL (ref 70–99)
HBA1C MFR BLD: 5.5 % (ref 4.8–5.9)
HCT VFR BLD CALC: 42.4 % (ref 36.3–47.1)
HEMOGLOBIN: 14.1 G/DL (ref 11.9–15.1)
MCH RBC QN AUTO: 31.3 PG (ref 25.2–33.5)
MCHC RBC AUTO-ENTMCNC: 33.3 G/DL (ref 28.4–34.8)
MCV RBC AUTO: 94.2 FL (ref 82.6–102.9)
NRBC AUTOMATED: 0 PER 100 WBC
PDW BLD-RTO: 11.9 % (ref 11.8–14.4)
PLATELET # BLD: 186 K/UL (ref 138–453)
PMV BLD AUTO: 10.6 FL (ref 8.1–13.5)
POTASSIUM SERPL-SCNC: 4 MMOL/L (ref 3.7–5.3)
RBC # BLD: 4.5 M/UL (ref 3.95–5.11)
SODIUM BLD-SCNC: 138 MMOL/L (ref 135–144)
URIC ACID: 3.7 MG/DL (ref 2.4–5.7)
WBC # BLD: 5 K/UL (ref 3.5–11.3)

## 2020-05-29 PROCEDURE — 84460 ALANINE AMINO (ALT) (SGPT): CPT

## 2020-05-29 PROCEDURE — 84550 ASSAY OF BLOOD/URIC ACID: CPT

## 2020-05-29 PROCEDURE — 84450 TRANSFERASE (AST) (SGOT): CPT

## 2020-05-29 PROCEDURE — 80048 BASIC METABOLIC PNL TOTAL CA: CPT

## 2020-05-29 PROCEDURE — 36415 COLL VENOUS BLD VENIPUNCTURE: CPT

## 2020-05-29 PROCEDURE — 83036 HEMOGLOBIN GLYCOSYLATED A1C: CPT

## 2020-05-29 PROCEDURE — 85027 COMPLETE CBC AUTOMATED: CPT

## 2020-06-05 ENCOUNTER — OFFICE VISIT (OUTPATIENT)
Dept: FAMILY MEDICINE CLINIC | Age: 60
End: 2020-06-05
Payer: COMMERCIAL

## 2020-06-05 VITALS
SYSTOLIC BLOOD PRESSURE: 110 MMHG | HEIGHT: 66 IN | DIASTOLIC BLOOD PRESSURE: 70 MMHG | WEIGHT: 189 LBS | BODY MASS INDEX: 30.37 KG/M2

## 2020-06-05 PROCEDURE — 99214 OFFICE O/P EST MOD 30 MIN: CPT | Performed by: FAMILY MEDICINE

## 2020-06-05 SDOH — ECONOMIC STABILITY: INCOME INSECURITY: HOW HARD IS IT FOR YOU TO PAY FOR THE VERY BASICS LIKE FOOD, HOUSING, MEDICAL CARE, AND HEATING?: NOT HARD AT ALL

## 2020-06-05 SDOH — ECONOMIC STABILITY: FOOD INSECURITY: WITHIN THE PAST 12 MONTHS, THE FOOD YOU BOUGHT JUST DIDN'T LAST AND YOU DIDN'T HAVE MONEY TO GET MORE.: NEVER TRUE

## 2020-06-05 SDOH — ECONOMIC STABILITY: FOOD INSECURITY: WITHIN THE PAST 12 MONTHS, YOU WORRIED THAT YOUR FOOD WOULD RUN OUT BEFORE YOU GOT MONEY TO BUY MORE.: NEVER TRUE

## 2020-06-05 ASSESSMENT — PATIENT HEALTH QUESTIONNAIRE - PHQ9
SUM OF ALL RESPONSES TO PHQ QUESTIONS 1-9: 0
SUM OF ALL RESPONSES TO PHQ9 QUESTIONS 1 & 2: 0
SUM OF ALL RESPONSES TO PHQ QUESTIONS 1-9: 0
1. LITTLE INTEREST OR PLEASURE IN DOING THINGS: 0
2. FEELING DOWN, DEPRESSED OR HOPELESS: 0

## 2020-06-05 NOTE — PROGRESS NOTES
(PRAVACHOL) 40 MG tablet TAKE ONE TABLET BY MOUTH DAILY 6/8/20   Celine Brenner MD       ROS:  General Constitutional: Denies chills. Denies fever. Denies headache. Denies lightheadedness. Ophthalmologic: Denies blurred vision. ENT: Denies nasal congestion. Denies sore throat. Denies ear pain and pressure. Respiratory: Denies cough. Denies shortness of breath. Denies wheezing. Cardiovascular: Denies chest pain at rest. Denies irregular heartbeat. Denies palpitations. Gastrointestinal: Denies abdominal pain. Denies blood in the stool. Denies constipation. Denies diarrhea. Denies nausea. Denies vomiting. Genitourinary: Denies blood in the urine. Denies difficulty urinating. Denies frequent urination. Denies painful urination. Denies urinary incontinence. Musculoskeletal: Denies muscle aches. Denies painful joints. Denies swollen joints. Peripheral Vascular: Denies pain/cramping in legs after exertion. Skin: Denies dry skin. Denies itching. Denies rash. Neurologic: Denies falls. Denies dizziness. Denies fainting. Denies tingling/numbness. Psychiatric: Denies sleep disturbance. Denies anxiety. Denies depressed mood.     Past Surgical History:   Procedure Laterality Date   [facility]  2012    Dr. Lorrie Carvalho CYSTOSCOPY  2008    R stent, ESWL    HYSTERECTOMY  2004       Family History   Problem Relation Age of Onset   [facility] Cancer Father         melanoma    High Cholesterol Mother     Other Mother         osteoporosis    Cancer Brother         Melanoma       Past Medical History:   Diagnosis Date    CRF (chronic renal failure)     Hyperglycemia     Hyperlipidemia     Hypertension     MVP (mitral valve prolapse)     Obesity     Renal calculi 2008    bilat      Social History     Tobacco Use    Smoking status: Never Smoker    Smokeless tobacco: Never Used   Substance Use Topics    Alcohol use: Not on file      Current Outpatient Medications   Medication Sig Dispense Refill

## 2020-06-05 NOTE — PATIENT INSTRUCTIONS
PLAN:  I review her lab results. Her creatinine level is slightly elevated but this is about where it has been for the last 10 years. We discuss the importance of controlling blood pressure, blood sugar, and avoiding NSAIDs. I don't see any lesions that are suspicious at this time. Given her family history of melanoma, I encourage her to continue to do routine skin checks. Her Dexa scan from 2016 is reviewed showing osteopenia. I encourage her to continue to take 2000 units of vitamin D daily. I will re-check this next year. She is doing well. I will see her back in 6 months. SURVEY:    You may be receiving a survey from [employer] regarding your visit today. Please complete the survey to enable us to provide the highest quality of care to you and your family. If you cannot score us a very good on any question, please call the office to discuss how we could have made your experience a very good one. Thank you.

## 2020-06-08 RX ORDER — PRAVASTATIN SODIUM 40 MG
TABLET ORAL
Qty: 30 TABLET | Refills: 4 | Status: SHIPPED | OUTPATIENT
Start: 2020-06-08 | End: 2020-11-06

## 2020-07-01 RX ORDER — METFORMIN HYDROCHLORIDE 500 MG/1
500 TABLET, EXTENDED RELEASE ORAL
Qty: 90 TABLET | Refills: 3 | Status: SHIPPED | OUTPATIENT
Start: 2020-07-01 | End: 2021-07-06

## 2020-10-06 RX ORDER — BISOPROLOL FUMARATE AND HYDROCHLOROTHIAZIDE 2.5; 6.25 MG/1; MG/1
TABLET ORAL
Qty: 90 TABLET | Refills: 2 | Status: SHIPPED | OUTPATIENT
Start: 2020-10-06 | End: 2021-07-06

## 2020-11-27 ENCOUNTER — HOSPITAL ENCOUNTER (OUTPATIENT)
Age: 60
Discharge: HOME | End: 2020-11-27
Payer: COMMERCIAL

## 2020-11-27 LAB
ALT SERPL-CCNC: 29 U/L (ref 5–33)
ANION GAP SERPL CALCULATED.3IONS-SCNC: 8 MMOL/L (ref 9–17)
AST SERPL-CCNC: 18 U/L
BUN BLDV-MCNC: 17 MG/DL (ref 8–23)
BUN/CREAT BLD: 18 (ref 9–20)
CALCIUM SERPL-MCNC: 9.8 MG/DL (ref 8.6–10.4)
CHLORIDE BLD-SCNC: 103 MMOL/L (ref 98–107)
CHOLESTEROL/HDL RATIO: 4.5
CHOLESTEROL: 202 MG/DL
CO2: 27 MMOL/L (ref 20–31)
CREAT SERPL-MCNC: 0.95 MG/DL (ref 0.5–0.9)
GFR AFRICAN AMERICAN: >60 ML/MIN
GFR NON-AFRICAN AMERICAN: 60 ML/MIN
GFR SERPL CREATININE-BSD FRML MDRD: ABNORMAL ML/MIN/{1.73_M2}
GFR SERPL CREATININE-BSD FRML MDRD: ABNORMAL ML/MIN/{1.73_M2}
GLUCOSE BLD-MCNC: 128 MG/DL (ref 70–99)
HCT VFR BLD CALC: 44.4 % (ref 36.3–47.1)
HDLC SERPL-MCNC: 45 MG/DL
HEMOGLOBIN: 14.4 G/DL (ref 11.9–15.1)
LDL CHOLESTEROL: 125 MG/DL (ref 0–130)
MCH RBC QN AUTO: 30.5 PG (ref 25.2–33.5)
MCHC RBC AUTO-ENTMCNC: 32.4 G/DL (ref 28.4–34.8)
MCV RBC AUTO: 94.1 FL (ref 82.6–102.9)
NRBC AUTOMATED: 0 PER 100 WBC
PDW BLD-RTO: 11.9 % (ref 11.8–14.4)
PLATELET # BLD: 194 K/UL (ref 138–453)
PMV BLD AUTO: 10.4 FL (ref 8.1–13.5)
POTASSIUM SERPL-SCNC: 4.3 MMOL/L (ref 3.7–5.3)
RBC # BLD: 4.72 M/UL (ref 3.95–5.11)
SODIUM BLD-SCNC: 138 MMOL/L (ref 135–144)
TRIGL SERPL-MCNC: 159 MG/DL
VITAMIN D 25-HYDROXY: 32.2 NG/ML (ref 30–100)
VLDLC SERPL CALC-MCNC: ABNORMAL MG/DL (ref 1–30)
WBC # BLD: 6.5 K/UL (ref 3.5–11.3)

## 2020-11-27 PROCEDURE — 82306 VITAMIN D 25 HYDROXY: CPT

## 2020-11-27 PROCEDURE — 36415 COLL VENOUS BLD VENIPUNCTURE: CPT

## 2020-11-27 PROCEDURE — 80061 LIPID PANEL: CPT

## 2020-11-27 PROCEDURE — 85027 COMPLETE CBC AUTOMATED: CPT

## 2020-11-27 PROCEDURE — 80048 BASIC METABOLIC PNL TOTAL CA: CPT

## 2020-11-27 PROCEDURE — 83036 HEMOGLOBIN GLYCOSYLATED A1C: CPT

## 2020-11-27 PROCEDURE — 84450 TRANSFERASE (AST) (SGOT): CPT

## 2020-11-27 PROCEDURE — 84460 ALANINE AMINO (ALT) (SGPT): CPT

## 2020-11-28 LAB
ESTIMATED AVERAGE GLUCOSE: 131 MG/DL
HBA1C MFR BLD: 6.2 % (ref 4–6)

## 2020-12-04 ENCOUNTER — OFFICE VISIT (OUTPATIENT)
Dept: FAMILY MEDICINE CLINIC | Age: 60
End: 2020-12-04
Payer: COMMERCIAL

## 2020-12-04 VITALS
WEIGHT: 196 LBS | SYSTOLIC BLOOD PRESSURE: 112 MMHG | BODY MASS INDEX: 31.5 KG/M2 | DIASTOLIC BLOOD PRESSURE: 80 MMHG | HEART RATE: 83 BPM | OXYGEN SATURATION: 96 % | HEIGHT: 66 IN

## 2020-12-04 PROCEDURE — 99214 OFFICE O/P EST MOD 30 MIN: CPT | Performed by: NURSE PRACTITIONER

## 2020-12-04 PROCEDURE — 90471 IMMUNIZATION ADMIN: CPT | Performed by: NURSE PRACTITIONER

## 2020-12-04 PROCEDURE — 90686 IIV4 VACC NO PRSV 0.5 ML IM: CPT | Performed by: NURSE PRACTITIONER

## 2020-12-04 RX ORDER — ZINC GLUCONATE 50 MG
50 TABLET ORAL DAILY
COMMUNITY

## 2020-12-04 ASSESSMENT — PATIENT HEALTH QUESTIONNAIRE - PHQ9
SUM OF ALL RESPONSES TO PHQ9 QUESTIONS 1 & 2: 0
SUM OF ALL RESPONSES TO PHQ QUESTIONS 1-9: 0
2. FEELING DOWN, DEPRESSED OR HOPELESS: 0
1. LITTLE INTEREST OR PLEASURE IN DOING THINGS: 0

## 2020-12-04 ASSESSMENT — ENCOUNTER SYMPTOMS
SINUS PRESSURE: 0
CONSTIPATION: 0
SORE THROAT: 0
COUGH: 1
CHEST TIGHTNESS: 0
DIARRHEA: 0
SINUS PAIN: 0
ABDOMINAL PAIN: 0
WHEEZING: 0
RHINORRHEA: 1
SHORTNESS OF BREATH: 0

## 2020-12-04 NOTE — PROGRESS NOTES
Name: Anjana Murphy  : 1960         Chief Complaint:     Chief Complaint   Patient presents with    Annual Exam     Patient comes in for 6 month check. denies any issues. History of Present Illness: Anjana Murphy is a 56 y.o.  female who presents with Annual Exam (Patient comes in for 6 month check. denies any issues. )      HPI     Hypertension:   Current medication regimen includes bisoprolol-hydrochlorothiazide 2.5-6.25 mg. She is adherent to a low-salt diet. Blood pressure is not being monitored at home. Patient denies chest pain, dyspnea and palpitations. Hyperlipidemia:   Current medication regimen includes pravastatin 40 mg once daily. Patient is following a low fat, low cholesterol diet. Admits well balanced diet. Hyperglycemia:  Current exercise routine includes walking 1 mile to and from work daily. Admits a history of prediabetes for which she takes Metformin 500 mg once daily with breakfast.    GERD:   Jake has a history of heartburn. Current medical therapy includes Pepcid 40mg qd and tums prn. Aggravating factors include pepperoni and chocolate. Past Medical History:     Past Medical History:   Diagnosis Date    CRF (chronic renal failure)     Hyperglycemia     Hyperlipidemia     Hypertension     MVP (mitral valve prolapse)     Obesity     Renal calculi     bilat      Reviewed all health maintenance requirements and ordered appropriate tests  Health Maintenance Due   Topic Date Due    Shingles Vaccine (1 of 2) 2008    Cervical cancer screen  2019       Past Surgical History:     Past Surgical History:   Procedure Laterality Date     SECTION      COLONOSCOPY      Dr. De Baca [place]  2008    R stent, ESWL    HYSTERECTOMY          Medications:       Prior to Admission medications    Medication Sig Start Date End Date Taking?  Authorizing Provider   zinc gluconate 50 MG tablet Take 50 mg by mouth daily   Yes Historical Provider, MD   pravastatin (PRAVACHOL) 40 MG tablet TAKE ONE TABLET BY MOUTH DAILY 11/6/20  Yes Stacey Jones MD   bisoprolol-hydroCHLOROthiazide [facility]) 2.5-6.25 MG per tablet TAKE ONE TABLET BY MOUTH DAILY 10/6/20  Yes Stacey Jones MD   metFORMIN (GLUCOPHAGE-XR) 500 MG extended release tablet Take 1 tablet by mouth daily (with breakfast) 7/1/20  Yes Stacey Jones MD   Calcium Carbonate (CALCIUM 600 PO) Take by mouth   Yes Historical Provider, MD   calcium carbonate (TUMS) 500 MG chewable tablet Take 1 tablet by mouth daily   Yes Historical Provider, MD   Multiple Vitamins-Minerals (THERAPEUTIC MULTIVITAMIN-MINERALS) tablet Take 1 tablet by mouth daily   Yes Historical Provider, MD   famotidine (PEPCID) 20 MG tablet Take 1 tablet by mouth 2 times daily 5/13/15  Yes Stacey Jones MD   Cholecalciferol (VITAMIN D) 2000 UNITS CAPS capsule Take 2,000 Units by mouth daily  Patient taking differently: Take 1,000 Units by mouth daily  5/13/15  Yes Stacey Jones MD        Allergies:       Patient has no known allergies. Social History:     Tobacco:    reports that she has never smoked. She has never used smokeless tobacco.  Alcohol:      has no history on file for alcohol. Drug Use:  has no history on file for drug. Family History:     Family History   Problem Relation Age of Onset   Bernestine Eduardo Cancer Father         melanoma    High Cholesterol Mother     Other Mother         osteoporosis    Cancer Brother         Melanoma       Review of Systems:     Positive and Negative as described in HPI    Review of Systems   Constitutional: Negative for chills, fatigue, fever and unexpected weight change. HENT: Positive for ear discharge (Admits feeling as though her left ear has \"cotton in it\") and rhinorrhea. Negative for congestion, sinus pressure, sinus pain and sore throat. Admits cough and clear rhinorrhea that began 1 to 2 days ago. Denies sick contacts or exposures to COVID-19.    Eyes: Negative for visual disturbance. Respiratory: Positive for cough (Productive with clear/yellow mucus). Negative for chest tightness, shortness of breath and wheezing. Cardiovascular: Negative for chest pain and palpitations. Gastrointestinal: Negative for abdominal pain, constipation and diarrhea. Genitourinary: Negative for difficulty urinating. Musculoskeletal: Negative for arthralgias, joint swelling and myalgias. Skin: Negative for rash. Neurological: Negative for dizziness, light-headedness and headaches. Physical Exam:   Vitals:  /80   Pulse 83   Ht 5' 6\" (1.676 m)   Wt 196 lb (88.9 kg)   SpO2 96%   BMI 31.64 kg/m²     Physical Exam  Constitutional:       General: She is not in acute distress. Appearance: Normal appearance. She is obese. She is not ill-appearing or toxic-appearing. HENT:      Head: Normocephalic. Right Ear: Ear canal and external ear normal. There is no impacted cerumen. Left Ear: Ear canal and external ear normal. There is no impacted cerumen. Ears:      Comments: TMs dull bilaterally, non-bulging     Nose: Rhinorrhea present. Mouth/Throat:      Mouth: Mucous membranes are moist.      Pharynx: No oropharyngeal exudate or posterior oropharyngeal erythema. Comments: +PND  Eyes:      General:         Left eye: No discharge. Extraocular Movements: Extraocular movements intact. Conjunctiva/sclera: Conjunctivae normal.      Pupils: Pupils are equal, round, and reactive to light. Cardiovascular:      Rate and Rhythm: Normal rate and regular rhythm. Heart sounds: Normal heart sounds. No murmur. Pulmonary:      Effort: Pulmonary effort is normal. No respiratory distress. Breath sounds: Normal breath sounds. No stridor. No wheezing, rhonchi or rales. Abdominal:      General: Bowel sounds are normal. There is no distension. Palpations: Abdomen is soft. There is no mass. Tenderness: There is no abdominal tenderness.  There is current symptoms are most suggestive of common cold. Due to patient family with recent COVID-23 diagnosis, offered COVID-19 test. Patient declined at this time. She will notify office if symptoms worsen or persist.    HTN:   --Stable. Continue current medication regimen. Completed Refills   Requested Prescriptions      No prescriptions requested or ordered in this encounter       Orders Placed This Encounter   Procedures    INFLUENZA, QUADV, 3 YRS AND OLDER, IM PF, PREFILL SYR OR SDV, 0.5ML (AFLURIA QUADV, PF)    ALT     Standing Status:   Future     Standing Expiration Date:   12/4/2021    AST     Standing Status:   Future     Standing Expiration Date:   12/4/2021    Basic Metabolic Panel     Standing Status:   Future     Standing Expiration Date:   12/4/2021    CBC     Standing Status:   Future     Standing Expiration Date:   12/4/2021    Hemoglobin A1C     Standing Status:   Future     Standing Expiration Date:   12/4/2021    Lipid Panel     Standing Status:   Future     Standing Expiration Date:   12/4/2021     Order Specific Question:   Is Patient Fasting?/# of Hours     Answer:   not fasting    Vitamin D 25 Hydroxy     Standing Status:   Future     Standing Expiration Date:   12/4/2021    TSH without Reflex     Standing Status:   Future     Standing Expiration Date:   12/4/2021        No results found for this visit on 12/04/20. Return in about 6 months (around 6/4/2021), or if symptoms worsen or fail to improve, for f/u. labs prior to appt.  .    Electronically signed by JERI Collins CNP on 12/04/20 at 10:02 PM.

## 2021-01-19 ENCOUNTER — OFFICE VISIT (OUTPATIENT)
Dept: FAMILY MEDICINE CLINIC | Age: 61
End: 2021-01-19
Payer: COMMERCIAL

## 2021-01-19 VITALS
WEIGHT: 198 LBS | SYSTOLIC BLOOD PRESSURE: 136 MMHG | DIASTOLIC BLOOD PRESSURE: 76 MMHG | TEMPERATURE: 99.3 F | BODY MASS INDEX: 31.82 KG/M2 | HEIGHT: 66 IN

## 2021-01-19 DIAGNOSIS — R31.9 URINARY TRACT INFECTION WITH HEMATURIA, SITE UNSPECIFIED: Primary | ICD-10-CM

## 2021-01-19 DIAGNOSIS — N39.0 URINARY TRACT INFECTION WITH HEMATURIA, SITE UNSPECIFIED: Primary | ICD-10-CM

## 2021-01-19 LAB
BILIRUBIN, POC: 0
BLOOD URINE, POC: POSITIVE
CLARITY, POC: NORMAL
COLOR, POC: YELLOW
GLUCOSE URINE, POC: NEGATIVE
KETONES, POC: NEGATIVE
LEUKOCYTE EST, POC: NORMAL
NITRITE, POC: NEGATIVE
PH, POC: 6
PROTEIN, POC: NEGATIVE
SPECIFIC GRAVITY, POC: 1
UROBILINOGEN, POC: NORMAL

## 2021-01-19 PROCEDURE — 99213 OFFICE O/P EST LOW 20 MIN: CPT | Performed by: FAMILY MEDICINE

## 2021-01-19 PROCEDURE — 81003 URINALYSIS AUTO W/O SCOPE: CPT | Performed by: FAMILY MEDICINE

## 2021-01-19 RX ORDER — PHENAZOPYRIDINE HYDROCHLORIDE 200 MG/1
200 TABLET, FILM COATED ORAL 3 TIMES DAILY PRN
Qty: 9 TABLET | Refills: 0 | Status: SHIPPED | OUTPATIENT
Start: 2021-01-19 | End: 2021-01-22

## 2021-01-19 RX ORDER — CEPHALEXIN 500 MG/1
500 CAPSULE ORAL 2 TIMES DAILY
Qty: 14 CAPSULE | Refills: 0 | Status: SHIPPED | OUTPATIENT
Start: 2021-01-19 | End: 2021-01-26

## 2021-01-19 ASSESSMENT — PATIENT HEALTH QUESTIONNAIRE - PHQ9
SUM OF ALL RESPONSES TO PHQ9 QUESTIONS 1 & 2: 0
SUM OF ALL RESPONSES TO PHQ QUESTIONS 1-9: 0

## 2021-01-19 NOTE — PROGRESS NOTES
IWaqar RMA am scribing for and in the presence of Dr. Mckeon Dec. 2021 2:01 pm Iman   [address]  [place] [address]  Dept: [phone]      HPI:  Patient complains of frequency, urgency, incomplete emptying of bladder, low grade temp, hematuria She has had symptoms for 3 days. Current Outpatient Medications   Medication Sig Dispense Refill    zinc gluconate 50 MG tablet Take 50 mg by mouth daily      pravastatin (PRAVACHOL) 40 MG tablet TAKE ONE TABLET BY MOUTH DAILY 90 tablet 3    bisoprolol-hydroCHLOROthiazide (ZIAC) 2.5-6.25 MG per tablet TAKE ONE TABLET BY MOUTH DAILY 90 tablet 2    metFORMIN (GLUCOPHAGE-XR) 500 MG extended release tablet Take 1 tablet by mouth daily (with breakfast) 90 tablet 3    Calcium Carbonate (CALCIUM 600 PO) Take by mouth      calcium carbonate (TUMS) 500 MG chewable tablet Take 1 tablet by mouth daily      Multiple Vitamins-Minerals (THERAPEUTIC MULTIVITAMIN-MINERALS) tablet Take 1 tablet by mouth daily      famotidine (PEPCID) 20 MG tablet Take 1 tablet by mouth 2 times daily 180 tablet 3    Cholecalciferol (VITAMIN D) 2000 UNITS CAPS capsule Take 2,000 Units by mouth daily (Patient taking differently: Take 1,000 Units by mouth daily ) 90 capsule 3    ciprofloxacin (CIPRO) 500 MG tablet Take 1 tablet by mouth 2 times daily for 7 days 14 tablet 0     No current facility-administered medications for this visit. ROS:  General Constitutional: Denies chills. Admits low grade fever. Gastrointestinal: Denies abdominal pain. Denies nausea. Denies vomiting. Genitourinary: Admits blood in the urine. Denies difficulty urinating. Admits frequent urination. Admits painful urination. Admits urgent urination.        Past Surgical History:   Procedure Laterality Date     SECTION      COLONOSCOPY      Dr. Nallely Baxter  2008    R stent, ESWL    HYSTERECTOMY   Family History   Problem Relation Age of Onset   Marcia Szymanski Cancer Father         melanoma    High Cholesterol Mother     Other Mother         osteoporosis    Cancer Brother         Melanoma       Past Medical History:   Diagnosis Date    CRF (chronic renal failure)     Hyperglycemia     Hyperlipidemia     Hypertension     MVP (mitral valve prolapse)     Obesity     Renal calculi 2008    bilat      Social History     Tobacco Use    Smoking status: Never Smoker    Smokeless tobacco: Never Used   Substance Use Topics    Alcohol use: Not on file      Current Outpatient Medications   Medication Sig Dispense Refill    zinc gluconate 50 MG tablet Take 50 mg by mouth daily      pravastatin (PRAVACHOL) 40 MG tablet TAKE ONE TABLET BY MOUTH DAILY 90 tablet 3    bisoprolol-hydroCHLOROthiazide (ZIAC) 2.5-6.25 MG per tablet TAKE ONE TABLET BY MOUTH DAILY 90 tablet 2    metFORMIN (GLUCOPHAGE-XR) 500 MG extended release tablet Take 1 tablet by mouth daily (with breakfast) 90 tablet 3    Calcium Carbonate (CALCIUM 600 PO) Take by mouth      calcium carbonate (TUMS) 500 MG chewable tablet Take 1 tablet by mouth daily      Multiple Vitamins-Minerals (THERAPEUTIC MULTIVITAMIN-MINERALS) tablet Take 1 tablet by mouth daily      famotidine (PEPCID) 20 MG tablet Take 1 tablet by mouth 2 times daily 180 tablet 3    Cholecalciferol (VITAMIN D) 2000 UNITS CAPS capsule Take 2,000 Units by mouth daily (Patient taking differently: Take 1,000 Units by mouth daily ) 90 capsule 3    ciprofloxacin (CIPRO) 500 MG tablet Take 1 tablet by mouth 2 times daily for 7 days 14 tablet 0     No current facility-administered medications for this visit.       No Known Allergies    PHYSICAL EXAM:    /76   Temp 99.3 °F (37.4 °C)   Ht 5' 6\" (1.676 m)   Wt 198 lb (89.8 kg)   BMI 31.96 kg/m²   Wt Readings from Last 3 Encounters:   01/19/21 198 lb (89.8 kg)   12/04/20 196 lb (88.9 kg)   06/05/20 189 lb (85.7 kg)

## 2021-01-19 NOTE — PATIENT INSTRUCTIONS
PLAN:  It's been 20 years since her last UTI. Many times, that bacteria that grows out of the urine is E-Coli. Given that these aren't recurrent, I don't think I need a culture. Her urine specimen shows blood and pus cells. I will prescribe keflex 500 mg bid x7 days and pyridium 200 mg tid prn x2 days. The patient is instructed to call the office if she is not improving in a couple days. SURVEY:    You may be receiving a survey from [employer] regarding your visit today. Please complete the survey to enable us to provide the highest quality of care to you and your family. If you cannot score us a very good on any question, please call the office to discuss how we could of made your experience a very good one. Thank you.

## 2021-01-26 ENCOUNTER — NURSE ONLY (OUTPATIENT)
Dept: FAMILY MEDICINE CLINIC | Age: 61
End: 2021-01-26
Payer: COMMERCIAL

## 2021-01-26 DIAGNOSIS — R31.9 URINARY TRACT INFECTION WITH HEMATURIA, SITE UNSPECIFIED: Primary | ICD-10-CM

## 2021-01-26 DIAGNOSIS — N39.0 URINARY TRACT INFECTION WITH HEMATURIA, SITE UNSPECIFIED: Primary | ICD-10-CM

## 2021-01-26 LAB
BILIRUBIN, POC: 0
BLOOD URINE, POC: NORMAL
CLARITY, POC: CLEAR
COLOR, POC: NORMAL
GLUCOSE URINE, POC: NEGATIVE
KETONES, POC: NEGATIVE
LEUKOCYTE EST, POC: NORMAL
NITRITE, POC: NEGATIVE
PH, POC: 8
PROTEIN, POC: NEGATIVE
SPECIFIC GRAVITY, POC: 1
UROBILINOGEN, POC: NORMAL

## 2021-01-26 PROCEDURE — 81003 URINALYSIS AUTO W/O SCOPE: CPT | Performed by: FAMILY MEDICINE

## 2021-01-26 NOTE — PROGRESS NOTES
Pt is here for her 1 week urine follow up  She finished her Keflex and states that he symptoms have resolved. Her urine is clear today.

## 2021-01-29 ENCOUNTER — HOSPITAL ENCOUNTER (OUTPATIENT)
Age: 61
Discharge: HOME | End: 2021-01-29
Payer: COMMERCIAL

## 2021-01-29 ENCOUNTER — TELEPHONE (OUTPATIENT)
Dept: FAMILY MEDICINE CLINIC | Age: 61
End: 2021-01-29

## 2021-01-29 DIAGNOSIS — R31.9 URINARY TRACT INFECTION WITH HEMATURIA, SITE UNSPECIFIED: ICD-10-CM

## 2021-01-29 DIAGNOSIS — N39.0 URINARY TRACT INFECTION WITH HEMATURIA, SITE UNSPECIFIED: Primary | ICD-10-CM

## 2021-01-29 DIAGNOSIS — R31.9 URINARY TRACT INFECTION WITH HEMATURIA, SITE UNSPECIFIED: Primary | ICD-10-CM

## 2021-01-29 DIAGNOSIS — N39.0 URINARY TRACT INFECTION WITH HEMATURIA, SITE UNSPECIFIED: ICD-10-CM

## 2021-01-29 PROCEDURE — 87088 URINE BACTERIA CULTURE: CPT

## 2021-01-29 PROCEDURE — 87086 URINE CULTURE/COLONY COUNT: CPT

## 2021-01-29 PROCEDURE — 87186 SC STD MICRODIL/AGAR DIL: CPT

## 2021-01-29 RX ORDER — CIPROFLOXACIN 500 MG/1
500 TABLET, FILM COATED ORAL 2 TIMES DAILY
Qty: 14 TABLET | Refills: 0 | Status: SHIPPED | OUTPATIENT
Start: 2021-01-29 | End: 2021-02-05

## 2021-01-31 LAB
CULTURE: ABNORMAL
Lab: ABNORMAL
SPECIMEN DESCRIPTION: ABNORMAL

## 2021-02-08 ENCOUNTER — NURSE ONLY (OUTPATIENT)
Dept: FAMILY MEDICINE CLINIC | Age: 61
End: 2021-02-08
Payer: COMMERCIAL

## 2021-02-08 DIAGNOSIS — R31.9 URINARY TRACT INFECTION WITH HEMATURIA, SITE UNSPECIFIED: Primary | ICD-10-CM

## 2021-02-08 DIAGNOSIS — N39.0 URINARY TRACT INFECTION WITH HEMATURIA, SITE UNSPECIFIED: Primary | ICD-10-CM

## 2021-02-08 LAB
BILIRUBIN, POC: 0
BLOOD URINE, POC: NEGATIVE
CLARITY, POC: CLEAR
COLOR, POC: YELLOW
GLUCOSE URINE, POC: NEGATIVE
KETONES, POC: NEGATIVE
LEUKOCYTE EST, POC: NEGATIVE
NITRITE, POC: NEGATIVE
PH, POC: 7
PROTEIN, POC: NEGATIVE
SPECIFIC GRAVITY, POC: 1.01
UROBILINOGEN, POC: NORMAL

## 2021-02-08 PROCEDURE — 81003 URINALYSIS AUTO W/O SCOPE: CPT | Performed by: FAMILY MEDICINE

## 2021-02-08 NOTE — PROGRESS NOTES
Pt. Presents for repeat UA following Abx course. Pt. Denies any signs/symptoms today. Urine was clear. Pt. Notfd.

## 2021-05-19 ENCOUNTER — HOSPITAL ENCOUNTER (OUTPATIENT)
Age: 61
Discharge: HOME | End: 2021-05-19
Payer: COMMERCIAL

## 2021-05-19 DIAGNOSIS — M85.80 OSTEOPENIA, UNSPECIFIED LOCATION: ICD-10-CM

## 2021-05-19 DIAGNOSIS — E78.5 HYPERLIPIDEMIA, UNSPECIFIED HYPERLIPIDEMIA TYPE: ICD-10-CM

## 2021-05-19 DIAGNOSIS — R73.9 HYPERGLYCEMIA: ICD-10-CM

## 2021-05-19 DIAGNOSIS — I10 ESSENTIAL HYPERTENSION: ICD-10-CM

## 2021-05-19 DIAGNOSIS — R63.5 WEIGHT GAIN: ICD-10-CM

## 2021-05-19 LAB
ALT SERPL-CCNC: 40 U/L (ref 5–33)
ANION GAP SERPL CALCULATED.3IONS-SCNC: 11 MMOL/L (ref 9–17)
AST SERPL-CCNC: 23 U/L
BUN BLDV-MCNC: 19 MG/DL (ref 8–23)
BUN/CREAT BLD: 22 (ref 9–20)
CALCIUM SERPL-MCNC: 10.2 MG/DL (ref 8.6–10.4)
CHLORIDE BLD-SCNC: 107 MMOL/L (ref 98–107)
CHOLESTEROL/HDL RATIO: 3.8
CHOLESTEROL: 174 MG/DL
CO2: 26 MMOL/L (ref 20–31)
CREAT SERPL-MCNC: 0.88 MG/DL (ref 0.5–0.9)
ESTIMATED AVERAGE GLUCOSE: 128 MG/DL
GFR AFRICAN AMERICAN: >60 ML/MIN
GFR NON-AFRICAN AMERICAN: >60 ML/MIN
GFR SERPL CREATININE-BSD FRML MDRD: ABNORMAL ML/MIN/{1.73_M2}
GFR SERPL CREATININE-BSD FRML MDRD: ABNORMAL ML/MIN/{1.73_M2}
GLUCOSE BLD-MCNC: 155 MG/DL (ref 70–99)
HBA1C MFR BLD: 6.1 % (ref 4–6)
HCT VFR BLD CALC: 44.5 % (ref 36.3–47.1)
HDLC SERPL-MCNC: 46 MG/DL
HEMOGLOBIN: 14.7 G/DL (ref 11.9–15.1)
LDL CHOLESTEROL: 94 MG/DL (ref 0–130)
MCH RBC QN AUTO: 30.8 PG (ref 25.2–33.5)
MCHC RBC AUTO-ENTMCNC: 33 G/DL (ref 28.4–34.8)
MCV RBC AUTO: 93.1 FL (ref 82.6–102.9)
NRBC AUTOMATED: 0 PER 100 WBC
PDW BLD-RTO: 12.3 % (ref 11.8–14.4)
PLATELET # BLD: 204 K/UL (ref 138–453)
PMV BLD AUTO: 10.4 FL (ref 8.1–13.5)
POTASSIUM SERPL-SCNC: 4.3 MMOL/L (ref 3.7–5.3)
RBC # BLD: 4.78 M/UL (ref 3.95–5.11)
SODIUM BLD-SCNC: 144 MMOL/L (ref 135–144)
TRIGL SERPL-MCNC: 168 MG/DL
TSH SERPL DL<=0.05 MIU/L-ACNC: 1.47 MIU/L (ref 0.3–5)
VITAMIN D 25-HYDROXY: 33.7 NG/ML (ref 30–100)
VLDLC SERPL CALC-MCNC: ABNORMAL MG/DL (ref 1–30)
WBC # BLD: 5.6 K/UL (ref 3.5–11.3)

## 2021-05-19 PROCEDURE — 80048 BASIC METABOLIC PNL TOTAL CA: CPT

## 2021-05-19 PROCEDURE — 82306 VITAMIN D 25 HYDROXY: CPT

## 2021-05-19 PROCEDURE — 84460 ALANINE AMINO (ALT) (SGPT): CPT

## 2021-05-19 PROCEDURE — 80061 LIPID PANEL: CPT

## 2021-05-19 PROCEDURE — 36415 COLL VENOUS BLD VENIPUNCTURE: CPT

## 2021-05-19 PROCEDURE — 85027 COMPLETE CBC AUTOMATED: CPT

## 2021-05-19 PROCEDURE — 83036 HEMOGLOBIN GLYCOSYLATED A1C: CPT

## 2021-05-19 PROCEDURE — 84443 ASSAY THYROID STIM HORMONE: CPT

## 2021-05-19 PROCEDURE — 84450 TRANSFERASE (AST) (SGOT): CPT

## 2021-05-25 ENCOUNTER — OFFICE VISIT (OUTPATIENT)
Dept: FAMILY MEDICINE CLINIC | Age: 61
End: 2021-05-25
Payer: COMMERCIAL

## 2021-05-25 VITALS
HEIGHT: 66 IN | SYSTOLIC BLOOD PRESSURE: 138 MMHG | DIASTOLIC BLOOD PRESSURE: 80 MMHG | BODY MASS INDEX: 31.66 KG/M2 | WEIGHT: 197 LBS

## 2021-05-25 DIAGNOSIS — R41.3 TRANSIENT AMNESIA: ICD-10-CM

## 2021-05-25 DIAGNOSIS — R73.9 HYPERGLYCEMIA: ICD-10-CM

## 2021-05-25 DIAGNOSIS — I10 ESSENTIAL HYPERTENSION: ICD-10-CM

## 2021-05-25 DIAGNOSIS — E78.5 HYPERLIPIDEMIA, UNSPECIFIED HYPERLIPIDEMIA TYPE: ICD-10-CM

## 2021-05-25 DIAGNOSIS — R51.9 INTRACTABLE EPISODIC HEADACHE, UNSPECIFIED HEADACHE TYPE: ICD-10-CM

## 2021-05-25 DIAGNOSIS — Z80.8 FAMILY HISTORY OF MELANOMA: Primary | ICD-10-CM

## 2021-05-25 PROCEDURE — 99214 OFFICE O/P EST MOD 30 MIN: CPT | Performed by: FAMILY MEDICINE

## 2021-05-25 RX ORDER — ASCORBIC ACID 500 MG
500 TABLET ORAL DAILY
COMMUNITY

## 2021-05-25 ASSESSMENT — PATIENT HEALTH QUESTIONNAIRE - PHQ9
SUM OF ALL RESPONSES TO PHQ9 QUESTIONS 1 & 2: 0
SUM OF ALL RESPONSES TO PHQ QUESTIONS 1-9: 0

## 2021-05-25 NOTE — PROGRESS NOTES
I, Jerelene Seip, JESSICA, am scribing for and in the presence of Dr. Abraham Elmore. 05/25/2021 2:19 pm Iman Muñoz  [address]  [place] [address]  Dept: Teena Cruz is a 56 y.o. female here for 6 Month Follow-Up, Hypertension, Hyperlipidemia, Hyperglycemia, and Gastroesophageal Reflux      HPI:  Hypertension:   Current medication regimen includes bisoprololhydrochlorothiazide 2.5-6.25 mg. She is adherent to a low-salt diet. Blood pressure is not being monitored at home. Patient denies chest pain, dyspnea and palpitations.     Hyperlipidemia:   Current medication regimen includes pravastatin 40 mg once daily. Patient is following a low fat, low cholesterol diet. Admits well balanced diet.      Hyperglycemia:  Current exercise routine includes walking 1 mile to and from work daily. Admits a history of prediabetes for which she takes Metformin 500 mg once daily with breakfast.     GERD:   Jake has a history of heartburn. Current medical therapy includes Pepcid 40mg qd and tums prn. Aggravating factors include pepperoni and chocolate. Prior to Admission medications    Medication Sig Start Date End Date Taking?  Authorizing Provider   ascorbic acid (VITAMIN C) 500 MG tablet Take 500 mg by mouth daily   Yes Historical Provider, MD   zinc gluconate 50 MG tablet Take 50 mg by mouth daily   Yes Historical Provider, MD   pravastatin (PRAVACHOL) 40 MG tablet TAKE ONE TABLET BY MOUTH DAILY 11/6/20  Yes Abraham Elmore MD   bisoprolol-hydroCHLOROthiazide [facility]) 2.5-6.25 MG per tablet TAKE ONE TABLET BY MOUTH DAILY 10/6/20  Yes Abraham Elmore MD   metFORMIN (GLUCOPHAGE-XR) 500 MG extended release tablet Take 1 tablet by mouth daily (with breakfast) 7/1/20  Yes Abraham Elmore MD   Calcium Carbonate (CALCIUM 600 PO) Take by mouth   Yes Historical Provider, MD   calcium carbonate (TUMS) 500 MG chewable tablet Take 1 tablet by mouth daily as needed    Yes Historical Provider, MD   Multiple Vitamins-Minerals (THERAPEUTIC MULTIVITAMIN-MINERALS) tablet Take 1 tablet by mouth daily   Yes Historical Provider, MD   famotidine (PEPCID) 20 MG tablet Take 1 tablet by mouth 2 times daily 5/13/15  Yes Mukund Early MD       ROS:  General Constitutional: Denies chills. Denies fever. 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 Everything came back later that evening. Denies lightheadedness. Ophthalmologic: Denies blurred vision. ENT: Denies nasal congestion. Denies sore throat. Denies ear pain and pressure. Respiratory: Denies cough. Denies shortness of breath. Denies wheezing. Cardiovascular: Denies chest pain at rest. Denies irregular heartbeat. Denies palpitations. Gastrointestinal: Denies abdominal pain. Denies blood in the stool. Denies constipation. Denies diarrhea. Denies nausea. Denies vomiting. Genitourinary: Denies blood in the urine. Denies difficulty urinating. Denies frequent urination. Denies painful urination. Denies urinary incontinence. Musculoskeletal: Denies muscle aches. Denies painful joints. Denies swollen joints. Admits intermittent stiffness in R achilles region, worse after sitting for a while and then getting up. Peripheral Vascular: Denies pain/cramping in legs after exertion. Skin: Denies dry skin. Denies itching. Denies rash. Neurologic: Denies falls. Denies dizziness. Denies fainting. Denies tingling/numbness. Psychiatric: Denies sleep disturbance. Denies anxiety. Denies depressed mood.     Past Surgical History:   Procedure Laterality Date     SECTION      COLONOSCOPY      Dr. Brandy Sood  2008    R stent, ESWL    HYSTERECTOMY  2004       Family History is vascular we will evaluate sources and  its best treated by managing BP and cholesterol. She has a strong family history of melanoma. I recommend that she see Dr. Angelika Waggoner for routine skin checks. I will see her back in 6 months and will call when I get scan results back. Orders Placed This Encounter   Procedures    MRI BRAIN WO CONTRAST     Standing Status:   Future     Standing Expiration Date:   5/25/2022     Order Specific Question:   Reason for exam:     Answer:   headache, transient amnesia    Lipid Panel     Standing Status:   Future     Standing Expiration Date:   5/25/2022     Order Specific Question:   Is Patient Fasting?/# of Hours     Answer:   none    ALT     Standing Status:   Future     Standing Expiration Date:   5/25/2022    AST     Standing Status:   Future     Standing Expiration Date:   5/25/2022    Basic Metabolic Panel     Standing Status:   Future     Standing Expiration Date:   5/25/2022    CBC With Auto Differential     Standing Status:   Future     Standing Expiration Date:   5/25/2022    Hemoglobin A1C     Standing Status:   Future     Standing Expiration Date:   5/25/2022    TSH without Reflex     Standing Status:   Future     Standing Expiration Date:   5/25/2022    External Referral To Dermatology     Referral Priority:   Routine     Referral Type:   Eval and Treat     Referral Reason:   Specialty Services Required     Referred to Provider:   Joseph Matthews     Requested Specialty:   Dermatology     Number of Visits Requested:   1     No orders of the defined types were placed in this encounter. I, Dr. Louie uDrbin, personally performed the services described in this documentation as scribed by CECY Dillard in my presence, and is both accurate and complete.

## 2021-05-25 NOTE — PATIENT INSTRUCTIONS
PLAN:  We discuss the recent episode of headache followed by difficulty recalling some things from her memory. It sounds as though this is limited to headache followed by transient focal amnesia. I am concerned about her frontal lobe and would like to evaluate for TIA. I would like to get an MRI scan as this sounds vascular. If this is vascular we will evaluate sources and  its best treated by managing BP and cholesterol. She has a strong family history of melanoma. I recommend that she see Dr. Gladys Jacobson for routine skin checks. I will see her back in 6 months and will call when I get scan results back. SURVEY:    You may be receiving a survey from [employer] regarding your visit today. Please complete the survey to enable us to provide the highest quality of care to you and your family. If you cannot score us a very good on any question, please call the office to discuss how we could of made your experience a very good one. Thank you.

## 2021-06-09 ENCOUNTER — HOSPITAL ENCOUNTER (OUTPATIENT)
Dept: MRI IMAGING | Age: 61
Discharge: HOME | End: 2021-06-11
Payer: COMMERCIAL

## 2021-06-09 DIAGNOSIS — R51.9 INTRACTABLE EPISODIC HEADACHE, UNSPECIFIED HEADACHE TYPE: ICD-10-CM

## 2021-06-09 DIAGNOSIS — R41.3 TRANSIENT AMNESIA: ICD-10-CM

## 2021-06-09 PROCEDURE — 70551 MRI BRAIN STEM W/O DYE: CPT

## 2021-07-06 RX ORDER — BISOPROLOL FUMARATE AND HYDROCHLOROTHIAZIDE 2.5; 6.25 MG/1; MG/1
TABLET ORAL
Qty: 90 TABLET | Refills: 1 | Status: SHIPPED | OUTPATIENT
Start: 2021-07-06 | End: 2021-12-29

## 2021-07-06 RX ORDER — METFORMIN HYDROCHLORIDE 500 MG/1
TABLET, EXTENDED RELEASE ORAL
Qty: 90 TABLET | Refills: 1 | Status: SHIPPED | OUTPATIENT
Start: 2021-07-06 | End: 2021-12-29

## 2021-07-06 NOTE — TELEPHONE ENCOUNTER
Health Maintenance   Topic Date Due    Shingles Vaccine (1 of 2) Never done    Cervical cancer screen  05/13/2019    DTaP/Tdap/Td vaccine (1 - Tdap) 11/16/2021 (Originally 6/29/1979)    Flu vaccine (1) 09/01/2021    Breast cancer screen  02/05/2022    A1C test (Diabetic or Prediabetic)  05/19/2022    Lipid screen  05/19/2022    Potassium monitoring  05/19/2022    Creatinine monitoring  05/19/2022    Colon cancer screen colonoscopy  05/24/2022    COVID-19 Vaccine  Completed    Hepatitis C screen  Addressed    HIV screen  Addressed    Hepatitis A vaccine  Aged Out    Hepatitis B vaccine  Aged Out    Hib vaccine  Aged Out    Meningococcal (ACWY) vaccine  Aged Out    Pneumococcal 0-64 years Vaccine  Aged Out             (applicable per patient's age: Cancer Screenings, Depression Screening, Fall Risk Screening, Immunizations)    Hemoglobin A1C (%)   Date Value   05/19/2021 6.1 (H)   11/27/2020 6.2 (H)   05/29/2020 5.5     Microalb/Crt.  Ratio (mcg/mg creat)   Date Value   05/24/2019 CANNOT BE CALCULATED     LDL Cholesterol (mg/dL)   Date Value   05/19/2021 94     AST (U/L)   Date Value   05/19/2021 23     ALT (U/L)   Date Value   05/19/2021 40 (H)     BUN (mg/dL)   Date Value   05/19/2021 19      (goal A1C is < 7)   (goal LDL is <100) need 30-50% reduction from baseline     BP Readings from Last 3 Encounters:   05/25/21 138/80   01/19/21 136/76   12/04/20 112/80    (goal /80)      All Future Testing planned in [facility]:  Lab Frequency Next Occurrence   Lipid Panel Once 11/14/2021   ALT Once 11/14/2021   AST Once 19/58/1309   Basic Metabolic Panel Once 98/77/1014   CBC With Auto Differential Once 11/14/2021   Hemoglobin A1C Once 11/14/2021   TSH without Reflex Once 11/14/2021       Next Visit Date:  Future Appointments   Date Time Provider Es Monge   11/23/2021  1:45 PM Lawanda Monroy MD [facility]            Patient Active Problem List:     Hyperglycemia     MVP (mitral valve prolapse)     Renal calculi     Obesity     CRF (chronic renal failure)     Other and unspecified hyperlipidemia     Gastroesophageal reflux disease without esophagitis     Chronic renal failure, stage 3 (moderate) (HCC)     Family history of melanoma     History of renal stone     Onychomycosis     Osteoarthritis of left knee     Essential hypertension     Osteopenia     Osteoarthritis of left shoulder     Need for prophylactic vaccination and inoculation against influenza     Allergic rhinitis     Viral URI

## 2021-07-06 NOTE — TELEPHONE ENCOUNTER
Health Maintenance   Topic Date Due    Shingles Vaccine (1 of 2) Never done    Cervical cancer screen  05/13/2019    DTaP/Tdap/Td vaccine (1 - Tdap) 11/16/2021 (Originally 6/29/1979)    Flu vaccine (1) 09/01/2021    Breast cancer screen  02/05/2022    A1C test (Diabetic or Prediabetic)  05/19/2022    Lipid screen  05/19/2022    Potassium monitoring  05/19/2022    Creatinine monitoring  05/19/2022    Colon cancer screen colonoscopy  05/24/2022    COVID-19 Vaccine  Completed    Hepatitis C screen  Addressed    HIV screen  Addressed    Hepatitis A vaccine  Aged Out    Hepatitis B vaccine  Aged Out    Hib vaccine  Aged Out    Meningococcal (ACWY) vaccine  Aged Out    Pneumococcal 0-64 years Vaccine  Aged Out             (applicable per patient's age: Cancer Screenings, Depression Screening, Fall Risk Screening, Immunizations)    Hemoglobin A1C (%)   Date Value   05/19/2021 6.1 (H)   11/27/2020 6.2 (H)   05/29/2020 5.5     Microalb/Crt.  Ratio (mcg/mg creat)   Date Value   05/24/2019 CANNOT BE CALCULATED     LDL Cholesterol (mg/dL)   Date Value   05/19/2021 94     AST (U/L)   Date Value   05/19/2021 23     ALT (U/L)   Date Value   05/19/2021 40 (H)     BUN (mg/dL)   Date Value   05/19/2021 19      (goal A1C is < 7)   (goal LDL is <100) need 30-50% reduction from baseline     BP Readings from Last 3 Encounters:   05/25/21 138/80   01/19/21 136/76   12/04/20 112/80    (goal /80)      All Future Testing planned in [facility]:  Lab Frequency Next Occurrence   Lipid Panel Once 11/14/2021   ALT Once 11/14/2021   AST Once 75/10/4143   Basic Metabolic Panel Once 79/23/7364   CBC With Auto Differential Once 11/14/2021   Hemoglobin A1C Once 11/14/2021   TSH without Reflex Once 11/14/2021       Next Visit Date:  Future Appointments   Date Time Provider Es Monge   11/23/2021  1:45 PM Daniel Jones MD [facility]            Patient Active Problem List:     Hyperglycemia     MVP (mitral valve prolapse)     Renal calculi     Obesity     CRF (chronic renal failure)     Other and unspecified hyperlipidemia     Gastroesophageal reflux disease without esophagitis     Chronic renal failure, stage 3 (moderate) (HCC)     Family history of melanoma     History of renal stone     Onychomycosis     Osteoarthritis of left knee     Essential hypertension     Osteopenia     Osteoarthritis of left shoulder     Need for prophylactic vaccination and inoculation against influenza     Allergic rhinitis     Viral URI

## 2021-11-05 RX ORDER — PRAVASTATIN SODIUM 40 MG
TABLET ORAL
Qty: 90 TABLET | Refills: 3 | Status: SHIPPED | OUTPATIENT
Start: 2021-11-05 | End: 2022-10-31

## 2021-11-18 ENCOUNTER — HOSPITAL ENCOUNTER (OUTPATIENT)
Age: 61
Discharge: HOME | End: 2021-11-18
Payer: COMMERCIAL

## 2021-11-18 DIAGNOSIS — I10 ESSENTIAL HYPERTENSION: ICD-10-CM

## 2021-11-18 DIAGNOSIS — R73.9 HYPERGLYCEMIA: ICD-10-CM

## 2021-11-18 DIAGNOSIS — E78.5 HYPERLIPIDEMIA, UNSPECIFIED HYPERLIPIDEMIA TYPE: ICD-10-CM

## 2021-11-18 LAB
ABSOLUTE EOS #: 0.24 K/UL (ref 0–0.44)
ABSOLUTE IMMATURE GRANULOCYTE: 0.04 K/UL (ref 0–0.3)
ABSOLUTE LYMPH #: 1.46 K/UL (ref 1.1–3.7)
ABSOLUTE MONO #: 0.64 K/UL (ref 0.1–1.2)
ALT SERPL-CCNC: 60 U/L (ref 5–33)
ANION GAP SERPL CALCULATED.3IONS-SCNC: 12 MMOL/L (ref 9–17)
AST SERPL-CCNC: 28 U/L
BASOPHILS # BLD: 1 % (ref 0–2)
BASOPHILS ABSOLUTE: 0.05 K/UL (ref 0–0.2)
BUN BLDV-MCNC: 19 MG/DL (ref 8–23)
BUN/CREAT BLD: 21 (ref 9–20)
CALCIUM SERPL-MCNC: 9.7 MG/DL (ref 8.6–10.4)
CHLORIDE BLD-SCNC: 106 MMOL/L (ref 98–107)
CHOLESTEROL/HDL RATIO: 5.8
CHOLESTEROL: 237 MG/DL
CO2: 25 MMOL/L (ref 20–31)
CREAT SERPL-MCNC: 0.92 MG/DL (ref 0.5–0.9)
DIFFERENTIAL TYPE: ABNORMAL
EOSINOPHILS RELATIVE PERCENT: 4 % (ref 1–4)
ESTIMATED AVERAGE GLUCOSE: 157 MG/DL
GFR AFRICAN AMERICAN: >60 ML/MIN
GFR NON-AFRICAN AMERICAN: >60 ML/MIN
GFR SERPL CREATININE-BSD FRML MDRD: ABNORMAL ML/MIN/{1.73_M2}
GFR SERPL CREATININE-BSD FRML MDRD: ABNORMAL ML/MIN/{1.73_M2}
GLUCOSE BLD-MCNC: 175 MG/DL (ref 70–99)
HBA1C MFR BLD: 7.1 % (ref 4–6)
HCT VFR BLD CALC: 44.4 % (ref 36.3–47.1)
HDLC SERPL-MCNC: 41 MG/DL
HEMOGLOBIN: 14.7 G/DL (ref 11.9–15.1)
IMMATURE GRANULOCYTES: 1 %
LDL CHOLESTEROL: 147 MG/DL (ref 0–130)
LYMPHOCYTES # BLD: 26 % (ref 24–43)
MCH RBC QN AUTO: 31.3 PG (ref 25.2–33.5)
MCHC RBC AUTO-ENTMCNC: 33.1 G/DL (ref 28.4–34.8)
MCV RBC AUTO: 94.5 FL (ref 82.6–102.9)
MONOCYTES # BLD: 11 % (ref 3–12)
NRBC AUTOMATED: 0 PER 100 WBC
PDW BLD-RTO: 12.1 % (ref 11.8–14.4)
PLATELET # BLD: 218 K/UL (ref 138–453)
PLATELET ESTIMATE: ABNORMAL
PMV BLD AUTO: 10.4 FL (ref 8.1–13.5)
POTASSIUM SERPL-SCNC: 4.2 MMOL/L (ref 3.7–5.3)
RBC # BLD: 4.7 M/UL (ref 3.95–5.11)
RBC # BLD: ABNORMAL 10*6/UL
SEG NEUTROPHILS: 57 % (ref 36–65)
SEGMENTED NEUTROPHILS ABSOLUTE COUNT: 3.18 K/UL (ref 1.5–8.1)
SODIUM BLD-SCNC: 143 MMOL/L (ref 135–144)
TRIGL SERPL-MCNC: 246 MG/DL
TSH SERPL DL<=0.05 MIU/L-ACNC: 1.44 MIU/L (ref 0.3–5)
VLDLC SERPL CALC-MCNC: ABNORMAL MG/DL (ref 1–30)
WBC # BLD: 5.6 K/UL (ref 3.5–11.3)
WBC # BLD: ABNORMAL 10*3/UL

## 2021-11-18 PROCEDURE — 84460 ALANINE AMINO (ALT) (SGPT): CPT

## 2021-11-18 PROCEDURE — 80061 LIPID PANEL: CPT

## 2021-11-18 PROCEDURE — 80048 BASIC METABOLIC PNL TOTAL CA: CPT

## 2021-11-18 PROCEDURE — 84443 ASSAY THYROID STIM HORMONE: CPT

## 2021-11-18 PROCEDURE — 83036 HEMOGLOBIN GLYCOSYLATED A1C: CPT

## 2021-11-18 PROCEDURE — 85025 COMPLETE CBC W/AUTO DIFF WBC: CPT

## 2021-11-18 PROCEDURE — 84450 TRANSFERASE (AST) (SGOT): CPT

## 2021-11-18 PROCEDURE — 36415 COLL VENOUS BLD VENIPUNCTURE: CPT

## 2021-11-23 ENCOUNTER — TELEPHONE (OUTPATIENT)
Dept: FAMILY MEDICINE CLINIC | Age: 61
End: 2021-11-23

## 2021-11-23 ENCOUNTER — HOSPITAL ENCOUNTER (OUTPATIENT)
Age: 61
Discharge: HOME | End: 2021-11-25
Payer: COMMERCIAL

## 2021-11-23 ENCOUNTER — HOSPITAL ENCOUNTER (OUTPATIENT)
Dept: GENERAL RADIOLOGY | Age: 61
Discharge: HOME | End: 2021-11-25
Payer: COMMERCIAL

## 2021-11-23 ENCOUNTER — OFFICE VISIT (OUTPATIENT)
Dept: FAMILY MEDICINE CLINIC | Age: 61
End: 2021-11-23
Payer: COMMERCIAL

## 2021-11-23 VITALS
HEIGHT: 66 IN | DIASTOLIC BLOOD PRESSURE: 76 MMHG | WEIGHT: 203 LBS | BODY MASS INDEX: 32.62 KG/M2 | OXYGEN SATURATION: 96 % | SYSTOLIC BLOOD PRESSURE: 132 MMHG

## 2021-11-23 DIAGNOSIS — M17.12 OSTEOARTHRITIS OF LEFT KNEE, UNSPECIFIED OSTEOARTHRITIS TYPE: ICD-10-CM

## 2021-11-23 DIAGNOSIS — M21.70 UNEQUAL LEG LENGTH: ICD-10-CM

## 2021-11-23 DIAGNOSIS — E78.5 HYPERLIPIDEMIA, UNSPECIFIED HYPERLIPIDEMIA TYPE: ICD-10-CM

## 2021-11-23 DIAGNOSIS — I10 ESSENTIAL HYPERTENSION: ICD-10-CM

## 2021-11-23 DIAGNOSIS — Z23 FLU VACCINE NEED: ICD-10-CM

## 2021-11-23 DIAGNOSIS — M16.11 LOCALIZED OSTEOARTHROSIS OF RIGHT HIP: ICD-10-CM

## 2021-11-23 DIAGNOSIS — R73.9 HYPERGLYCEMIA: ICD-10-CM

## 2021-11-23 DIAGNOSIS — Z00.00 WELLNESS EXAMINATION: Primary | ICD-10-CM

## 2021-11-23 PROBLEM — J06.9 VIRAL URI: Status: RESOLVED | Noted: 2019-05-31 | Resolved: 2021-11-23

## 2021-11-23 PROCEDURE — 90674 CCIIV4 VAC NO PRSV 0.5 ML IM: CPT | Performed by: FAMILY MEDICINE

## 2021-11-23 PROCEDURE — 99213 OFFICE O/P EST LOW 20 MIN: CPT | Performed by: FAMILY MEDICINE

## 2021-11-23 PROCEDURE — 90471 IMMUNIZATION ADMIN: CPT | Performed by: FAMILY MEDICINE

## 2021-11-23 PROCEDURE — 73521 X-RAY EXAM HIPS BI 2 VIEWS: CPT

## 2021-11-23 PROCEDURE — 99396 PREV VISIT EST AGE 40-64: CPT | Performed by: FAMILY MEDICINE

## 2021-11-23 NOTE — RESULT ENCOUNTER NOTE
Notify rather severe arthritis of the right hip and moderate arthritis left hip  the lumbar spine also has degenerative changes  in total this likely leads to her limited mobiltity and stiffness  lets have her try a short course of meloxicam 15 mg /day for 2 weeks and update us on effect

## 2021-11-23 NOTE — PROGRESS NOTES
I, Narciso Ceballos, [facility], am scribing for and in the presence of Dr. Robe Fernandez. 11/23/21/2:00/CRF    [id number] [address]  [phone]  Dept: Teena Cruz is a 59 y.o. female here for Annual Exam, Hypertension, Hyperlipidemia, and Hyperglycemia    - feels like shes turning into a 'board' her R heel hurts and is she is not walking as much and her L knee or R hip will go out . - admits fear of falling     HPI:  Hypertension:   Current medication regimen includes bisoprololhydrochlorothiazide 2.5-6.25 mg. She is adherent to a low-salt diet. Blood pressure is not being monitored at home. Patient denies chest pain, dyspnea and palpitations.     Hyperlipidemia:   Current medication regimen includes pravastatin 40 mg once daily. Patient is following a low fat, low cholesterol diet. Admits well balanced diet.      Hyperglycemia:  Current exercise routine includes walking 1 mile to and from work daily. María Elena Brand a history of prediabetes for which she takes Metformin 500 mg once daily with breakfast.     GERD:   Jake has a history of heartburn. Current medical therapy includes Pepcid 40mg qd and tums prn. Aggravating factors include pepperoni and chocolate. Prior to Admission medications    Medication Sig Start Date End Date Taking?  Authorizing Provider   pravastatin (PRAVACHOL) 40 MG tablet TAKE ONE TABLET BY MOUTH DAILY 11/5/21  Yes Robe Fernandez MD   metFORMIN (GLUCOPHAGE-XR) 500 MG extended release tablet TAKE ONE TABLET BY MOUTH DAILY WITH BREAKFAST 7/6/21  Yes Robe Fernandez MD   bisoprolol-hydroCHLOROthiazide [facility]) 2.5-6.25 MG per tablet TAKE ONE TABLET BY MOUTH DAILY 7/6/21  Yes Robe Fernandez MD   ascorbic acid (VITAMIN C) 500 MG tablet Take 500 mg by mouth daily   Yes Historical Provider, MD   zinc gluconate 50 MG tablet Take 50 mg by mouth daily   Yes Historical Provider, MD   Calcium Carbonate (CALCIUM 600 PO) Take by mouth   Yes Historical Provider, MD   calcium carbonate (TUMS) 500 MG chewable tablet Take 1 tablet by mouth daily as needed    Yes Historical Provider, MD   Multiple Vitamins-Minerals (THERAPEUTIC MULTIVITAMIN-MINERALS) tablet Take 1 tablet by mouth daily   Yes Historical Provider, MD   famotidine (PEPCID) 20 MG tablet Take 1 tablet by mouth 2 times daily 5/13/15  Yes Kyra Knott MD       ROS:  General Constitutional: Denies chills. Denies fever. Denies headache. Denies lightheadedness. Ophthalmologic: Denies blurred vision. ENT: Denies nasal congestion. Denies sore throat. Denies ear pain and pressure. Respiratory: Denies cough. Denies shortness of breath. Denies wheezing. Cardiovascular: Denies chest pain at rest. Denies irregular heartbeat. Denies palpitations. Gastrointestinal: Denies abdominal pain. Denies blood in the stool. Denies constipation. Denies diarrhea. Denies nausea. Denies vomiting. Genitourinary: Denies blood in the urine. Denies difficulty urinating. Denies frequent urination. Denies painful urination. Denies urinary incontinence. Musculoskeletal: Denies muscle aches. admits painful joints. Denies swollen joints. feels like shes turning into a 'board' her R heel hurts and is she is not walking as much and her L knee or R hip will go out . Admits tight and burning sensation in heel  Peripheral Vascular: Denies pain/cramping in legs after exertion. Skin: Denies dry skin. Denies itching. Denies rash. Neurologic: Denies falls. Denies dizziness. Denies fainting. Denies tingling/numbness. Psychiatric: Denies sleep disturbance. Denies anxiety. Denies depressed mood.  Admits fear of falling     Past Surgical History:   Procedure Laterality Date   [facility]  2012    Dr. Hilary Vick  2008    R stent, ESWL    HYSTERECTOMY  2004       Family History   Problem Relation Age of Onset    High Cholesterol Mother     Other Mother         osteoporosis    Cancer Father melanoma    Cancer Brother         Melanoma       Past Medical History:   Diagnosis Date    CRF (chronic renal failure)     Hyperglycemia     Hyperlipidemia     Hypertension     MVP (mitral valve prolapse)     Obesity     Renal calculi 2008    bilat      Social History     Tobacco Use    Smoking status: Never Smoker    Smokeless tobacco: Never Used   Substance Use Topics    Alcohol use: Not on file      Current Outpatient Medications   Medication Sig Dispense Refill    pravastatin (PRAVACHOL) 40 MG tablet TAKE ONE TABLET BY MOUTH DAILY 90 tablet 3    metFORMIN (GLUCOPHAGE-XR) 500 MG extended release tablet TAKE ONE TABLET BY MOUTH DAILY WITH BREAKFAST 90 tablet 1    bisoprolol-hydroCHLOROthiazide (ZIAC) 2.5-6.25 MG per tablet TAKE ONE TABLET BY MOUTH DAILY 90 tablet 1    ascorbic acid (VITAMIN C) 500 MG tablet Take 500 mg by mouth daily      zinc gluconate 50 MG tablet Take 50 mg by mouth daily      Calcium Carbonate (CALCIUM 600 PO) Take by mouth      calcium carbonate (TUMS) 500 MG chewable tablet Take 1 tablet by mouth daily as needed       Multiple Vitamins-Minerals (THERAPEUTIC MULTIVITAMIN-MINERALS) tablet Take 1 tablet by mouth daily      famotidine (PEPCID) 20 MG tablet Take 1 tablet by mouth 2 times daily 180 tablet 3     No current facility-administered medications for this visit. No Known Allergies    PHYSICAL EXAM:    /76   Ht 5' 6\" (1.676 m)   Wt 203 lb (92.1 kg)   SpO2 96%   BMI 32.77 kg/m²   Wt Readings from Last 3 Encounters:   11/23/21 203 lb (92.1 kg)   05/25/21 197 lb (89.4 kg)   01/19/21 198 lb (89.8 kg)     BP Readings from Last 3 Encounters:   11/23/21 132/76   05/25/21 138/80   01/19/21 136/76       General Appearance: in no acute distress, well developed, well nourished. Eyes: pupils equal, round reactive to light and accommodation. Ears: normal canal and TM's. Nose: nares patent, no lesions.   Oral Cavity: mucosa moist.  Throat: clear.  Neck/Thyroid: neck supple, full range of motion, no cervical lymphadenopathy, no thyromegaly or carotid bruits. Skin: warm and dry. No suspicious lesions. Heart: regular rate and rhythm. No murmurs. S1, S2 normal, no gallops. Lungs: clear to auscultation bilaterally. Abdomen: bowel sounds present, soft, nontender, nondistended, no masses or organomegaly. Musculoskeletal: normal, full range of motion in knees and hips, no swelling or tenderness. Extremities: no cyanosis or edema. achillis tendonesis,R calcaneal tendonitis  , patella crepatis on left knee ,  Right hip internal rotation limited , L>R quad atrophy  Pelvic tilt with left higher    Peripheral Pulses: 2+ throughout, symetric. Neurologic: nonfocal, motor strength normal upper and lower extremities, sensory exam intact. Psych: normal affect, speech fluent. GAIT: hitch in L hip , smooth gait, good arm swing fluent movements, R 92 cm L 95cm significant difference       ASSESSMENT:   Diagnosis Orders   1. Flu vaccine need  INFLUENZA, MDCK QUADV, 2 YRS AND OLDER, IM, PF, PREFILL SYR OR SDV, 0.5ML (FLUCELVAX QUADV, PF)    ME IMMUNIZ ADMIN,1 SINGLE/COMB VAC/TOXOID   2. Unequal leg length  XR HIP BILATERAL W AP PELVIS (2 VIEWS)   3. Hyperglycemia  ALT    AST    Basic Metabolic Panel    Hemoglobin A1C    TSH without Reflex   4. Hyperlipidemia, unspecified hyperlipidemia type  ALT    AST    Basic Metabolic Panel    Hemoglobin A1C    TSH without Reflex   5. Essential hypertension  ALT    AST    Basic Metabolic Panel    Hemoglobin A1C    TSH without Reflex   6. Osteoarthritis of left knee, unspecified osteoarthritis type     7. Localized osteoarthrosis of right hip         PLAN:  We discuss her stiffness sensation that started with the heel. It has progressed to additionally her knee and hip giving out occasionally.  I confirm her heel pain is most likely calcaneal tendonitis, I recommend she add a insert to slightly elevate her foot and ice it at the end of each day. I notice a difference in leg length and would like a xray of her hips. I recommend she work on flexibility stretches and weight lose for improvement on her discomfort   I would also like her to add a 1 cm lift in R shoe    I review her labs and her blood count is good along with her kidney level. Her cholesterol is higher then usual and her A1C is higher then it has ever been at 7.1. I would like her to increase her metformin to 2 tablets each day. She received her flu vaccine today in office     I will see her back in 6 months with labs     Orders Placed This Encounter   Procedures    XR HIP BILATERAL W AP PELVIS (2 VIEWS)     Standing Status:   Future     Number of Occurrences:   1     Standing Expiration Date:   11/23/2022     Order Specific Question:   Reason for exam:     Answer:   Unequal leg length    INFLUENZA, MDCK QUADV, 2 YRS AND OLDER, IM, PF, PREFILL SYR OR SDV, 0.5ML (FLUCELVAX QUADV, PF)    ALT     Standing Status:   Future     Standing Expiration Date:   11/23/2022    AST     Standing Status:   Future     Standing Expiration Date:   11/23/2022    Basic Metabolic Panel     Standing Status:   Future     Standing Expiration Date:   11/23/2022    Hemoglobin A1C     Standing Status:   Future     Standing Expiration Date:   11/23/2022    TSH without Reflex     Standing Status:   Future     Standing Expiration Date:   11/23/2022    VT IMMUNIZ ADMIN,1 SINGLE/COMB VAC/TOXOID     No orders of the defined types were placed in this encounter. I, Dr. Raji Dumont, personally performed the services described in this documentation as scribed/transcribed by DANAY Brady in my presence, and is both accurate and complete.

## 2021-11-23 NOTE — TELEPHONE ENCOUNTER
----- Message from Ana M Pepper MD sent at 11/23/2021  4:58 PM EST -----  Notify rather severe arthritis of the right hip and moderate arthritis left hip  the lumbar spine also has degenerative changes  in total this likely leads to her limited mobiltity and stiffness  lets have her try a short course of meloxicam 15 mg /day for 2 weeks and update us on effect

## 2021-11-23 NOTE — PATIENT INSTRUCTIONS
PLAN:  We discuss her stiffness sensation that started with the heel. It has progressed to additionally her knee and hip giving out occasionally. I confirm her heel pain is most likely calcaneal tendonitis, I recommend she add a insert to slightly elevate her foot and ice it at the end of each day. I notice a difference in leg length and would like a xray of her hips. I recommend she work on flexibility stretches and weight lose for improvement on her discomfort   I would also like her to add a 1 cm lift in R shoe    I review her labs and her blood count is good along with her kidney level. Her cholesterol is higher then usual and her A1C is higher then it has ever been at 7.1. She received her flu vaccine today in office     I will see her back in 6 months with labs       SURVEY:    You may be receiving a survey from [employer] regarding your visit today. Please complete the survey to enable us to provide the highest quality of care to you and your family. If you cannot score us a very good on any question, please call the office to discuss how we could of made your experience a very good one. Thank you.

## 2021-11-24 ENCOUNTER — PATIENT MESSAGE (OUTPATIENT)
Dept: FAMILY MEDICINE CLINIC | Age: 61
End: 2021-11-24

## 2021-11-24 RX ORDER — MELOXICAM 15 MG/1
15 TABLET ORAL DAILY
Qty: 14 TABLET | Refills: 0 | Status: SHIPPED | OUTPATIENT
Start: 2021-11-24 | End: 2021-12-06

## 2021-11-24 NOTE — TELEPHONE ENCOUNTER
From: Sean Hartmann  To: Dr. Jose G Schmitt: 11/24/2021 1:02 PM EST  Subject: meloxicam prescription    I just checked with [facility] via their website and they don't appear to have my prescription for meloxicam yet. Could you send it again? I would like to start it asap.   Thanks

## 2021-12-06 RX ORDER — MELOXICAM 15 MG/1
TABLET ORAL
Qty: 14 TABLET | Refills: 0 | Status: SHIPPED | OUTPATIENT
Start: 2021-12-06 | End: 2021-12-17

## 2021-12-17 RX ORDER — MELOXICAM 15 MG/1
TABLET ORAL
Qty: 30 TABLET | Refills: 0 | Status: SHIPPED | OUTPATIENT
Start: 2021-12-17 | End: 2022-01-14

## 2021-12-29 RX ORDER — METFORMIN HYDROCHLORIDE 500 MG/1
TABLET, EXTENDED RELEASE ORAL
Qty: 90 TABLET | Refills: 1 | Status: SHIPPED
Start: 2021-12-29 | End: 2021-12-30 | Stop reason: DRUGHIGH

## 2021-12-29 RX ORDER — BISOPROLOL FUMARATE AND HYDROCHLOROTHIAZIDE 2.5; 6.25 MG/1; MG/1
TABLET ORAL
Qty: 90 TABLET | Refills: 1 | Status: SHIPPED | OUTPATIENT
Start: 2021-12-29 | End: 2022-07-05

## 2021-12-30 ENCOUNTER — PATIENT MESSAGE (OUTPATIENT)
Dept: FAMILY MEDICINE CLINIC | Age: 61
End: 2021-12-30

## 2021-12-30 RX ORDER — METFORMIN HYDROCHLORIDE 500 MG/1
TABLET, EXTENDED RELEASE ORAL
Qty: 180 TABLET | Refills: 3 | Status: SHIPPED | OUTPATIENT
Start: 2021-12-30 | End: 2022-06-27

## 2021-12-30 RX ORDER — METFORMIN HYDROCHLORIDE 500 MG/1
1000 TABLET, EXTENDED RELEASE ORAL DAILY
Qty: 120 TABLET | Refills: 1 | Status: SHIPPED | OUTPATIENT
Start: 2021-12-30

## 2021-12-30 NOTE — TELEPHONE ENCOUNTER
From: Jose M Estrella  To: Dr. Lopez Shape: 12/30/2021 11:03 AM EST  Subject: Metformin prescription    At my last visit, the dosage for Metformin was increased to 2 per day. Lorrie Rudolph just requested a new prescription for Metformin and filled it at only 1 per day. Do I need a different prescription for the new dosage or am I to only take 1 per day?

## 2021-12-30 NOTE — TELEPHONE ENCOUNTER
PLAN: 11/23/21  We discuss her stiffness sensation that started with the heel. It has progressed to additionally her knee and hip giving out occasionally. I confirm her heel pain is most likely calcaneal tendonitis, I recommend she add a insert to slightly elevate her foot and ice it at the end of each day. I notice a difference in leg length and would like a xray of her hips. I recommend she work on flexibility stretches and weight lose for improvement on her discomfort   I would also like her to add a 1 cm lift in R shoe     I review her labs and her blood count is good along with her kidney level. Her cholesterol is higher then usual and her A1C is higher then it has ever been at 7.1.  I would like her to increase her metformin to 2 tablets each day.      She received her flu vaccine today in office      I will see her back in 6 months with labs

## 2022-01-14 RX ORDER — MELOXICAM 15 MG/1
TABLET ORAL
Qty: 90 TABLET | Refills: 1 | Status: SHIPPED
Start: 2022-01-14 | End: 2022-05-25 | Stop reason: SINTOL

## 2022-05-18 ENCOUNTER — HOSPITAL ENCOUNTER (OUTPATIENT)
Age: 62
Discharge: HOME | End: 2022-05-18
Payer: COMMERCIAL

## 2022-05-18 DIAGNOSIS — R73.9 HYPERGLYCEMIA: ICD-10-CM

## 2022-05-18 DIAGNOSIS — I10 ESSENTIAL HYPERTENSION: ICD-10-CM

## 2022-05-18 DIAGNOSIS — E78.5 HYPERLIPIDEMIA, UNSPECIFIED HYPERLIPIDEMIA TYPE: ICD-10-CM

## 2022-05-18 LAB
ALT SERPL-CCNC: 62 U/L (ref 5–33)
ANION GAP SERPL CALCULATED.3IONS-SCNC: 10 MMOL/L (ref 9–17)
AST SERPL-CCNC: 32 U/L
BUN BLDV-MCNC: 19 MG/DL (ref 8–23)
BUN/CREAT BLD: 20 (ref 9–20)
CALCIUM SERPL-MCNC: 9.3 MG/DL (ref 8.6–10.4)
CHLORIDE BLD-SCNC: 104 MMOL/L (ref 98–107)
CO2: 25 MMOL/L (ref 20–31)
CREAT SERPL-MCNC: 0.96 MG/DL (ref 0.5–0.9)
GFR AFRICAN AMERICAN: >60 ML/MIN
GFR NON-AFRICAN AMERICAN: 59 ML/MIN
GFR SERPL CREATININE-BSD FRML MDRD: ABNORMAL ML/MIN/{1.73_M2}
GFR SERPL CREATININE-BSD FRML MDRD: ABNORMAL ML/MIN/{1.73_M2}
GLUCOSE BLD-MCNC: 132 MG/DL (ref 70–99)
POTASSIUM SERPL-SCNC: 3.8 MMOL/L (ref 3.7–5.3)
SODIUM BLD-SCNC: 139 MMOL/L (ref 135–144)
TSH SERPL DL<=0.05 MIU/L-ACNC: 1.57 UIU/ML (ref 0.3–5)

## 2022-05-18 PROCEDURE — 84443 ASSAY THYROID STIM HORMONE: CPT

## 2022-05-18 PROCEDURE — 83036 HEMOGLOBIN GLYCOSYLATED A1C: CPT

## 2022-05-18 PROCEDURE — 80048 BASIC METABOLIC PNL TOTAL CA: CPT

## 2022-05-18 PROCEDURE — 36415 COLL VENOUS BLD VENIPUNCTURE: CPT

## 2022-05-18 PROCEDURE — 84450 TRANSFERASE (AST) (SGOT): CPT

## 2022-05-18 PROCEDURE — 84460 ALANINE AMINO (ALT) (SGPT): CPT

## 2022-05-19 LAB
ESTIMATED AVERAGE GLUCOSE: 128 MG/DL
HBA1C MFR BLD: 6.1 % (ref 4–6)

## 2022-05-25 ENCOUNTER — OFFICE VISIT (OUTPATIENT)
Dept: FAMILY MEDICINE CLINIC | Age: 62
End: 2022-05-25
Payer: COMMERCIAL

## 2022-05-25 VITALS
HEIGHT: 66 IN | DIASTOLIC BLOOD PRESSURE: 74 MMHG | BODY MASS INDEX: 31.18 KG/M2 | SYSTOLIC BLOOD PRESSURE: 122 MMHG | WEIGHT: 194 LBS

## 2022-05-25 DIAGNOSIS — M21.70 UNEQUAL LEG LENGTH: ICD-10-CM

## 2022-05-25 DIAGNOSIS — Z12.31 OTHER SCREENING MAMMOGRAM: ICD-10-CM

## 2022-05-25 DIAGNOSIS — M17.12 OSTEOARTHRITIS OF LEFT KNEE, UNSPECIFIED OSTEOARTHRITIS TYPE: ICD-10-CM

## 2022-05-25 DIAGNOSIS — R73.9 HYPERGLYCEMIA: Primary | ICD-10-CM

## 2022-05-25 DIAGNOSIS — M16.11 LOCALIZED OSTEOARTHROSIS OF RIGHT HIP: ICD-10-CM

## 2022-05-25 DIAGNOSIS — E78.5 HYPERLIPIDEMIA, UNSPECIFIED HYPERLIPIDEMIA TYPE: ICD-10-CM

## 2022-05-25 DIAGNOSIS — R74.8 ELEVATED LIVER ENZYMES: ICD-10-CM

## 2022-05-25 DIAGNOSIS — K21.9 GASTROESOPHAGEAL REFLUX DISEASE WITHOUT ESOPHAGITIS: ICD-10-CM

## 2022-05-25 DIAGNOSIS — I10 ESSENTIAL HYPERTENSION: ICD-10-CM

## 2022-05-25 PROCEDURE — 99214 OFFICE O/P EST MOD 30 MIN: CPT | Performed by: FAMILY MEDICINE

## 2022-05-25 SDOH — ECONOMIC STABILITY: FOOD INSECURITY: WITHIN THE PAST 12 MONTHS, YOU WORRIED THAT YOUR FOOD WOULD RUN OUT BEFORE YOU GOT MONEY TO BUY MORE.: NEVER TRUE

## 2022-05-25 SDOH — ECONOMIC STABILITY: FOOD INSECURITY: WITHIN THE PAST 12 MONTHS, THE FOOD YOU BOUGHT JUST DIDN'T LAST AND YOU DIDN'T HAVE MONEY TO GET MORE.: NEVER TRUE

## 2022-05-25 ASSESSMENT — PATIENT HEALTH QUESTIONNAIRE - PHQ9
SUM OF ALL RESPONSES TO PHQ QUESTIONS 1-9: 0
2. FEELING DOWN, DEPRESSED OR HOPELESS: 0
SUM OF ALL RESPONSES TO PHQ9 QUESTIONS 1 & 2: 0
SUM OF ALL RESPONSES TO PHQ QUESTIONS 1-9: 0
1. LITTLE INTEREST OR PLEASURE IN DOING THINGS: 0

## 2022-05-25 ASSESSMENT — SOCIAL DETERMINANTS OF HEALTH (SDOH): HOW HARD IS IT FOR YOU TO PAY FOR THE VERY BASICS LIKE FOOD, HOUSING, MEDICAL CARE, AND HEATING?: NOT HARD AT ALL

## 2022-05-25 NOTE — PROGRESS NOTES
CECY Young, am scribing for and in the presence of Dr. Cristino Tirado. 5/25/22/8:20am/SNP    [address] [address]  [place] [phone]  Dept: [phone]    Valeria Garcia is a 59 y.o. female here for 6 Month Follow-Up, Hypertension, Hyperlipidemia, Hyperglycemia, and Gastroesophageal Reflux    HPI:  Hypertension:   Current medication regimen includes bisoprolol-hydrochlorothiazide 2.5-6.25 mg. She is adherent to a low-salt diet. Blood pressure is not being monitored at home. Patient denies chest pain, dyspnea and palpitations.     Hyperlipidemia:   Current medication regimen includes pravastatin 40 mg once daily. Patient is following a low fat, low cholesterol diet. Admits well balanced diet.      Hyperglycemia:  Current exercise routine includes walking 1 mile to and from work daily. Tim Auak a history of prediabetes for which she takes Metformin 500 mg once daily with breakfast.     GERD:   Jake has a history of heartburn. Current medical therapy includes Pepcid 40mg qd and tums prn. Aggravating factors include pepperoni and chocolate.     Prior to Admission medications    Medication Sig Start Date End Date Taking?  Authorizing Provider   metFORMIN (GLUCOPHAGE-XR) 500 MG extended release tablet TAKE ONE TABLET BY MOUTH TWICE DAILY 12/30/21  Yes Cristino Tirado MD   metFORMIN (GLUCOPHAGE-XR) 500 MG extended release tablet Take 2 tablets by mouth daily 12/30/21  Yes Cristino Tirado MD   bisoprolol-hydroCHLOROthiazide [facility]) 2.5-6.25 MG per tablet TAKE ONE TABLET BY MOUTH DAILY 12/29/21  Yes Cristino Tirado MD   pravastatin (PRAVACHOL) 40 MG tablet TAKE ONE TABLET BY MOUTH DAILY 11/5/21  Yes Cristino Tirado MD   ascorbic acid (VITAMIN C) 500 MG tablet Take 500 mg by mouth daily   Yes Historical Provider, MD   zinc gluconate 50 MG tablet Take 50 mg by mouth daily   Yes Historical Provider, MD   Calcium Carbonate (CALCIUM 600 PO) Take by mouth   Yes Historical Provider, MD   calcium carbonate (TUMS) 500 MG chewable tablet Take 1 tablet by mouth daily as needed    Yes Historical Provider, MD   Multiple Vitamins-Minerals (THERAPEUTIC MULTIVITAMIN-MINERALS) tablet Take 1 tablet by mouth daily   Yes Historical Provider, MD   famotidine (PEPCID) 20 MG tablet Take 1 tablet by mouth 2 times daily 5/13/15  Yes Stacey Jones MD     ROS:  General Constitutional: Denies chills. Denies fever. Denies headache. Denies lightheadedness. Ophthalmologic: Denies blurred vision. ENT: Denies nasal congestion. Denies sore throat. Denies ear pain and pressure. Respiratory: Denies cough. Denies shortness of breath. Denies wheezing. Cardiovascular: Denies chest pain at rest. Denies irregular heartbeat. Denies palpitations. Gastrointestinal: Denies abdominal pain. Denies blood in the stool. Denies constipation. Denies diarrhea. Denies nausea. Denies vomiting. Genitourinary: Denies blood in the urine. Denies difficulty urinating. Denies frequent urination. Denies painful urination. Denies urinary incontinence. Musculoskeletal: Denies muscle aches. Denies painful joints. Denies swollen joints. Peripheral Vascular: Denies pain/cramping in legs after exertion. Skin: Denies dry skin. Denies itching. Denies rash. Neurologic: Denies falls. Denies dizziness. Denies fainting. Denies tingling/numbness. Psychiatric: Denies sleep disturbance. Denies anxiety. Denies depressed mood.     Past Surgical History:   Procedure Laterality Date     SECTION      COLONOSCOPY      Dr. Milo Fernandez CYSTOSCOPY  2008    R stent, ESWL    HYSTERECTOMY  2004     Family History   Problem Relation Age of Onset    High Cholesterol Mother     Other Mother         osteoporosis    Cancer Father         melanoma    Cancer Brother         Melanoma     Past Medical History:   Diagnosis Date    CRF (chronic renal failure)     Hyperglycemia     Hyperlipidemia     Hypertension     MVP (mitral valve prolapse)     Obesity     Renal calculi 2008    bilat      Social History     Tobacco Use    Smoking status: Never Smoker    Smokeless tobacco: Never Used   Substance Use Topics    Alcohol use: Not on file      Current Outpatient Medications   Medication Sig Dispense Refill    metFORMIN (GLUCOPHAGE-XR) 500 MG extended release tablet TAKE ONE TABLET BY MOUTH TWICE DAILY 180 tablet 3    metFORMIN (GLUCOPHAGE-XR) 500 MG extended release tablet Take 2 tablets by mouth daily 120 tablet 1    bisoprolol-hydroCHLOROthiazide (ZIAC) 2.5-6.25 MG per tablet TAKE ONE TABLET BY MOUTH DAILY 90 tablet 1    pravastatin (PRAVACHOL) 40 MG tablet TAKE ONE TABLET BY MOUTH DAILY 90 tablet 3    ascorbic acid (VITAMIN C) 500 MG tablet Take 500 mg by mouth daily      zinc gluconate 50 MG tablet Take 50 mg by mouth daily      Calcium Carbonate (CALCIUM 600 PO) Take by mouth      calcium carbonate (TUMS) 500 MG chewable tablet Take 1 tablet by mouth daily as needed       Multiple Vitamins-Minerals (THERAPEUTIC MULTIVITAMIN-MINERALS) tablet Take 1 tablet by mouth daily      famotidine (PEPCID) 20 MG tablet Take 1 tablet by mouth 2 times daily 180 tablet 3     No current facility-administered medications for this visit. No Known Allergies    PHYSICAL EXAM:    /74 (Site: Left Upper Arm, Position: Sitting, Cuff Size: Medium Adult)   Ht 5' 6\" (1.676 m)   Wt 194 lb (88 kg)   BMI 31.31 kg/m²   Wt Readings from Last 3 Encounters:   05/25/22 194 lb (88 kg)   11/23/21 203 lb (92.1 kg)   05/25/21 197 lb (89.4 kg)     BP Readings from Last 3 Encounters:   05/25/22 122/74   11/23/21 132/76   05/25/21 138/80     General Appearance: in no acute distress, well developed, well nourished. Eyes: pupils equal, round reactive to light and accommodation. Ears: normal canal and TM's. Nose: nares patent, no lesions. Oral Cavity: mucosa moist.  Throat: clear.   Neck/Thyroid: neck supple, full range of motion, no cervical lymphadenopathy, no thyromegaly or carotid bruits. Skin: warm and dry. No suspicious lesions. Heart: regular rate and rhythm. No murmurs. S1, S2 normal, no gallops. Rate 70  Lungs: clear to auscultation bilaterally. Abdomen: bowel sounds present, soft, nontender, nondistended, no masses or organomegaly. Musculoskeletal: normal, full range of motion in knees and hips, no swelling or tenderness. Extremities: no cyanosis or edema. Peripheral Pulses: 2+ throughout, symetric. Neurologic: nonfocal, motor strength normal upper and lower extremities, sensory exam intact. Psych: normal affect, speech fluent. ASSESSMENT:   Diagnosis Orders   1. Hyperglycemia  Hemoglobin A1C    Comprehensive Metabolic Panel   2. Hyperlipidemia, unspecified hyperlipidemia type  Lipid Panel   3. Essential hypertension  CBC with Auto Differential   4. Gastroesophageal reflux disease without esophagitis     5. Elevated liver enzymes  Comprehensive Metabolic Panel    Comprehensive Metabolic Panel   6. Other screening mammogram  ZAKI DIGITAL SCREEN W OR WO CAD BILATERAL   7. Unequal leg length     8. Osteoarthritis of left knee, unspecified osteoarthritis type     9. Localized osteoarthrosis of right hip       PLAN:  Labs reviewed, her ALT is elevated. She does not drink alcohol very often and medications reviewed. She was started on Meloxicam and the timeing seems consistent with this and metformin was doubled but she has been on metformin for years and has been on pravastatin for years alos previously. She also states that she has been taking Tylenol more lately due to gardening and sinus headaches, but not more than 2 tablets twice a day. She states she does not notice if she misses a dose of Meloxicam. I do not suspect this is from metformin, she has been on this for a number of years. I will discontinue Meloxicam for the next month and I will check a CMP in 1 month to see how this changes.  I would suggest for her to use Voltaren gel as needed for the arthritic pain. She is agreeable with this. We discuss her hip pain, she states she is not ready for surgical intervention. We discuss PRP, platelet rich plasma, and I suggest for her to see Dr. Robert Casas. I also recommend for her to focus on non weight baring exercises as well as work on range of motion. She is doing well overall, I will see her back in 6 months with labs prior. Orders Placed This Encounter   Procedures    ZAKI DIGITAL SCREEN W OR WO CAD BILATERAL     Standing Status:   Future     Standing Expiration Date:   7/25/2023    Comprehensive Metabolic Panel     Standing Status:   Future     Standing Expiration Date:   5/25/2023    Hemoglobin A1C     Standing Status:   Future     Standing Expiration Date:   5/25/2024    Lipid Panel     Standing Status:   Future     Standing Expiration Date:   5/25/2024     Order Specific Question:   Is Patient Fasting?/# of Hours     Answer:   0    CBC with Auto Differential     Standing Status:   Future     Standing Expiration Date:   5/25/2024    Comprehensive Metabolic Panel     Standing Status:   Future     Standing Expiration Date:   5/25/2024     No orders of the defined types were placed in this encounter. I, Dr. Sraah Mccullough, personally performed the services described in this documentation as scribed/transcribed by TRUNG Small in my presence, and is both accurate and complete.

## 2022-06-20 RX ORDER — BISOPROLOL FUMARATE AND HYDROCHLOROTHIAZIDE 2.5; 6.25 MG/1; MG/1
TABLET ORAL
Qty: 90 TABLET | Refills: 1 | OUTPATIENT
Start: 2022-06-20

## 2022-06-21 ENCOUNTER — HOSPITAL ENCOUNTER (OUTPATIENT)
Age: 62
Discharge: HOME | End: 2022-06-21
Payer: COMMERCIAL

## 2022-06-21 DIAGNOSIS — R74.8 ELEVATED LIVER ENZYMES: ICD-10-CM

## 2022-06-21 LAB
ALBUMIN SERPL-MCNC: 4.5 G/DL (ref 3.5–5.2)
ALBUMIN/GLOBULIN RATIO: 1.7 (ref 1–2.5)
ALP BLD-CCNC: 91 U/L (ref 35–104)
ALT SERPL-CCNC: 74 U/L (ref 5–33)
ANION GAP SERPL CALCULATED.3IONS-SCNC: 12 MMOL/L (ref 9–17)
AST SERPL-CCNC: 39 U/L
BILIRUB SERPL-MCNC: 0.42 MG/DL (ref 0.3–1.2)
BUN BLDV-MCNC: 16 MG/DL (ref 8–23)
BUN/CREAT BLD: 18 (ref 9–20)
CALCIUM SERPL-MCNC: 9.6 MG/DL (ref 8.6–10.4)
CHLORIDE BLD-SCNC: 104 MMOL/L (ref 98–107)
CO2: 25 MMOL/L (ref 20–31)
CREAT SERPL-MCNC: 0.88 MG/DL (ref 0.5–0.9)
GFR AFRICAN AMERICAN: >60 ML/MIN
GFR NON-AFRICAN AMERICAN: >60 ML/MIN
GFR SERPL CREATININE-BSD FRML MDRD: ABNORMAL ML/MIN/{1.73_M2}
GFR SERPL CREATININE-BSD FRML MDRD: ABNORMAL ML/MIN/{1.73_M2}
GLUCOSE BLD-MCNC: 115 MG/DL (ref 70–99)
POTASSIUM SERPL-SCNC: 3.9 MMOL/L (ref 3.7–5.3)
SODIUM BLD-SCNC: 141 MMOL/L (ref 135–144)
TOTAL PROTEIN: 7.1 G/DL (ref 6.4–8.3)

## 2022-06-21 PROCEDURE — 36415 COLL VENOUS BLD VENIPUNCTURE: CPT

## 2022-06-21 PROCEDURE — 80053 COMPREHEN METABOLIC PANEL: CPT

## 2022-06-22 NOTE — RESULT ENCOUNTER NOTE
Call  liver enzymes remain elevated  I would recommend some further testing  we should revisit to discuss this

## 2022-06-27 RX ORDER — BISOPROLOL FUMARATE AND HYDROCHLOROTHIAZIDE 2.5; 6.25 MG/1; MG/1
TABLET ORAL
Qty: 90 TABLET | Refills: 1 | OUTPATIENT
Start: 2022-06-27

## 2023-01-09 PROBLEM — Z12.11 COLON CANCER SCREENING: Status: ACTIVE | Noted: 2023-01-09

## 2023-02-08 PROBLEM — Z12.11 COLON CANCER SCREENING: Status: RESOLVED | Noted: 2023-01-09 | Resolved: 2023-02-08

## 2023-03-08 PROBLEM — Z12.11 COLON CANCER SCREENING: Status: ACTIVE | Noted: 2023-01-09

## 2023-04-07 PROBLEM — Z12.11 COLON CANCER SCREENING: Status: RESOLVED | Noted: 2023-01-09 | Resolved: 2023-04-07

## 2024-03-11 ENCOUNTER — HOSPITAL ENCOUNTER (OUTPATIENT)
Age: 64
Discharge: HOME | End: 2024-03-11
Payer: COMMERCIAL

## 2024-03-11 PROCEDURE — 93005 ELECTROCARDIOGRAM TRACING: CPT | Performed by: ORTHOPAEDIC SURGERY

## 2024-03-12 PROBLEM — E11.9 TYPE 2 DIABETES MELLITUS WITHOUT COMPLICATION, WITHOUT LONG-TERM CURRENT USE OF INSULIN (HCC): Status: ACTIVE | Noted: 2024-03-12

## 2024-03-12 PROBLEM — R73.03 PREDIABETES: Status: ACTIVE | Noted: 2024-03-12

## 2024-03-12 PROBLEM — Z96.641 S/P TOTAL RIGHT HIP ARTHROPLASTY: Status: ACTIVE | Noted: 2024-03-12

## 2024-03-12 LAB
EKG ATRIAL RATE: 70 BPM
EKG P AXIS: 55 DEGREES
EKG P-R INTERVAL: 162 MS
EKG Q-T INTERVAL: 406 MS
EKG QRS DURATION: 94 MS
EKG QTC CALCULATION (BAZETT): 438 MS
EKG R AXIS: 8 DEGREES
EKG T AXIS: 18 DEGREES
EKG VENTRICULAR RATE: 70 BPM

## 2024-03-12 PROCEDURE — 93010 ELECTROCARDIOGRAM REPORT: CPT | Performed by: INTERNAL MEDICINE

## 2024-09-16 ENCOUNTER — HOSPITAL ENCOUNTER (OUTPATIENT)
Dept: PREADMISSION TESTING | Age: 64
Discharge: HOME | End: 2024-09-20
Attending: ORTHOPAEDIC SURGERY | Admitting: ORTHOPAEDIC SURGERY
Payer: COMMERCIAL

## 2024-09-16 ENCOUNTER — HOSPITAL ENCOUNTER (OUTPATIENT)
Dept: GENERAL RADIOLOGY | Age: 64
Discharge: HOME | End: 2024-09-18
Attending: ORTHOPAEDIC SURGERY
Payer: COMMERCIAL

## 2024-09-16 ENCOUNTER — TELEPHONE (OUTPATIENT)
Dept: PREADMISSION TESTING | Age: 64
End: 2024-09-16

## 2024-09-16 VITALS
DIASTOLIC BLOOD PRESSURE: 61 MMHG | SYSTOLIC BLOOD PRESSURE: 130 MMHG | WEIGHT: 179 LBS | RESPIRATION RATE: 14 BRPM | OXYGEN SATURATION: 98 % | HEART RATE: 76 BPM | HEIGHT: 67 IN | BODY MASS INDEX: 28.09 KG/M2 | TEMPERATURE: 97.1 F

## 2024-09-16 LAB
ABO + RH BLD: NORMAL
ALBUMIN SERPL-MCNC: 4.4 G/DL (ref 3.5–5.2)
ALBUMIN/GLOB SERPL: 1.6 {RATIO} (ref 1–2.5)
ALP SERPL-CCNC: 86 U/L (ref 35–104)
ALT SERPL-CCNC: 21 U/L (ref 10–35)
ANION GAP SERPL CALCULATED.3IONS-SCNC: 9 MMOL/L (ref 9–16)
ARM BAND NUMBER: NORMAL
AST SERPL-CCNC: 17 U/L (ref 10–35)
BACTERIA URNS QL MICRO: NORMAL
BASOPHILS # BLD: <0.03 K/UL (ref 0–0.2)
BASOPHILS NFR BLD: 0 % (ref 0–2)
BILIRUB SERPL-MCNC: 0.3 MG/DL (ref 0–1.2)
BILIRUB UR QL STRIP: NEGATIVE
BLOOD BANK SAMPLE EXPIRATION: NORMAL
BLOOD GROUP ANTIBODIES SERPL: NEGATIVE
BUN SERPL-MCNC: 20 MG/DL (ref 8–23)
BUN/CREAT SERPL: 20 (ref 9–20)
CALCIUM SERPL-MCNC: 9.8 MG/DL (ref 8.6–10.4)
CHLORIDE SERPL-SCNC: 101 MMOL/L (ref 98–107)
CLARITY UR: CLEAR
CO2 SERPL-SCNC: 30 MMOL/L (ref 20–31)
COLOR UR: YELLOW
CREAT SERPL-MCNC: 1 MG/DL (ref 0.5–0.9)
EOSINOPHIL # BLD: 0.21 K/UL (ref 0–0.44)
EOSINOPHILS RELATIVE PERCENT: 3 % (ref 1–4)
EPI CELLS #/AREA URNS HPF: NORMAL /HPF (ref 0–25)
ERYTHROCYTE [DISTWIDTH] IN BLOOD BY AUTOMATED COUNT: 12.2 % (ref 11.8–14.4)
GFR, ESTIMATED: 64 ML/MIN/1.73M2
GLUCOSE SERPL-MCNC: 99 MG/DL (ref 74–99)
GLUCOSE UR STRIP-MCNC: NEGATIVE MG/DL
HCT VFR BLD AUTO: 41.7 % (ref 36.3–47.1)
HGB BLD-MCNC: 14 G/DL (ref 11.9–15.1)
HGB UR QL STRIP.AUTO: ABNORMAL
IMM GRANULOCYTES # BLD AUTO: 0.03 K/UL (ref 0–0.3)
IMM GRANULOCYTES NFR BLD: 0 %
INR PPP: 1
KETONES UR STRIP-MCNC: NEGATIVE MG/DL
LEUKOCYTE ESTERASE UR QL STRIP: NEGATIVE
LYMPHOCYTES NFR BLD: 1.86 K/UL (ref 1.1–3.7)
LYMPHOCYTES RELATIVE PERCENT: 25 % (ref 24–43)
MCH RBC QN AUTO: 31.7 PG (ref 25.2–33.5)
MCHC RBC AUTO-ENTMCNC: 33.6 G/DL (ref 28.4–34.8)
MCV RBC AUTO: 94.6 FL (ref 82.6–102.9)
MONOCYTES NFR BLD: 0.74 K/UL (ref 0.1–1.2)
MONOCYTES NFR BLD: 10 % (ref 3–12)
NEUTROPHILS NFR BLD: 62 % (ref 36–65)
NEUTS SEG NFR BLD: 4.63 K/UL (ref 1.5–8.1)
NITRITE UR QL STRIP: NEGATIVE
NRBC BLD-RTO: 0 PER 100 WBC
PARTIAL THROMBOPLASTIN TIME: 28.2 SEC (ref 26.8–34.8)
PH UR STRIP: 6 [PH] (ref 5–9)
PLATELET # BLD AUTO: 233 K/UL (ref 138–453)
PMV BLD AUTO: 9.9 FL (ref 8.1–13.5)
POTASSIUM SERPL-SCNC: 3.9 MMOL/L (ref 3.7–5.3)
PROT SERPL-MCNC: 7.1 G/DL (ref 6.6–8.7)
PROT UR STRIP-MCNC: NEGATIVE MG/DL
PROTHROMBIN TIME: 12.9 SEC (ref 11.7–14.1)
RBC # BLD AUTO: 4.41 M/UL (ref 3.95–5.11)
RBC #/AREA URNS HPF: NORMAL /HPF (ref 0–2)
SODIUM SERPL-SCNC: 140 MMOL/L (ref 136–145)
SP GR UR STRIP: 1.01 (ref 1.01–1.02)
UROBILINOGEN UR STRIP-ACNC: NORMAL EU/DL (ref 0–1)
WBC #/AREA URNS HPF: NORMAL /HPF (ref 0–5)
WBC OTHER # BLD: 7.5 K/UL (ref 3.5–11.3)

## 2024-09-16 PROCEDURE — 85610 PROTHROMBIN TIME: CPT

## 2024-09-16 PROCEDURE — 81001 URINALYSIS AUTO W/SCOPE: CPT

## 2024-09-16 PROCEDURE — 85730 THROMBOPLASTIN TIME PARTIAL: CPT

## 2024-09-16 PROCEDURE — 86850 RBC ANTIBODY SCREEN: CPT

## 2024-09-16 PROCEDURE — 93005 ELECTROCARDIOGRAM TRACING: CPT | Performed by: ORTHOPAEDIC SURGERY

## 2024-09-16 PROCEDURE — 86901 BLOOD TYPING SEROLOGIC RH(D): CPT

## 2024-09-16 PROCEDURE — 87641 MR-STAPH DNA AMP PROBE: CPT

## 2024-09-16 PROCEDURE — 80053 COMPREHEN METABOLIC PANEL: CPT

## 2024-09-16 PROCEDURE — 85025 COMPLETE CBC W/AUTO DIFF WBC: CPT

## 2024-09-16 PROCEDURE — 86900 BLOOD TYPING SEROLOGIC ABO: CPT

## 2024-09-16 PROCEDURE — 36415 COLL VENOUS BLD VENIPUNCTURE: CPT

## 2024-09-16 PROCEDURE — 71046 X-RAY EXAM CHEST 2 VIEWS: CPT

## 2024-09-17 LAB
EKG ATRIAL RATE: 72 BPM
EKG P AXIS: 56 DEGREES
EKG P-R INTERVAL: 162 MS
EKG Q-T INTERVAL: 404 MS
EKG QRS DURATION: 88 MS
EKG QTC CALCULATION (BAZETT): 442 MS
EKG R AXIS: 1 DEGREES
EKG T AXIS: 14 DEGREES
EKG VENTRICULAR RATE: 72 BPM
MRSA, DNA, NASAL: NEGATIVE
SPECIMEN DESCRIPTION: NORMAL

## 2024-09-17 PROCEDURE — 93010 ELECTROCARDIOGRAM REPORT: CPT | Performed by: INTERNAL MEDICINE

## 2024-09-30 ENCOUNTER — HOSPITAL ENCOUNTER (OUTPATIENT)
Dept: PHYSICAL THERAPY | Age: 64
Setting detail: THERAPIES SERIES
Discharge: HOME | End: 2024-09-30
Payer: COMMERCIAL

## 2024-09-30 PROCEDURE — 97116 GAIT TRAINING THERAPY: CPT

## 2024-09-30 PROCEDURE — 97110 THERAPEUTIC EXERCISES: CPT

## 2024-09-30 NOTE — PLAN OF CARE
Phone: [phone]        Fax: [phone]  [facility]  Physical Therapy  Gait Training/Discharge Summary  Date: 2024    Patient Name: Hina Pollock          : 1960  (64 y.o.)  [facility] #: [id number]    Referring Physician: Shiv Tuttle MD     Diagnosis: L TKA    Reason for Referral:  [] Crutch Training   [x] Walker Training   [] Other:    Objective Assessment:    [x]  Strength of uninvolved extremities are all within functional limits   []  Other:      Weight Bearing Status:  [] Right Extremity  [] Left Extremity  [] NWB  [] PWB    [x] WBAT  [] TTWB    Treatment:  [x] Instructed in appropriate gait with crutches/walker  [x]   Crutches/walker fitted to patient at accurate height  [x] Instructed on gait training at level ground  [x] Instructed on gait training with use of stairs  [x] Instructed on sit to stand utilizing crutches/walker  []   Other:     Home Exercise Program - Instructed Patient:   [x] Handout given regarding LE ROM / strengthening exercises      Treatment Goals:  [x]  Met []  Not Met 2024   [x] Patient will be independent crutch/walker training    Treatment Plan:   [x]  Gait training, as noted above    [x]  Exercise education, as stated above     Rehab Potential: []  Poor   []  Fair   [x]  Good   []  Excellent     If there are any questions regarding the plan of care, please do not hesitate to contact the center.   Thank you for your referral.      Therapist’s Signature:  SHIV BUCHANAN PT, PT, DPT            Date: 2024        To be completed by the referring physicians   By signing below, I agree to the above treatment plan.      Physician’s Signature:                        Date: 2024

## 2024-10-02 ENCOUNTER — ANESTHESIA EVENT (OUTPATIENT)
Dept: OPERATING ROOM | Age: 64
End: 2024-10-02
Payer: COMMERCIAL

## 2024-10-03 ENCOUNTER — APPOINTMENT (OUTPATIENT)
Dept: GENERAL RADIOLOGY | Age: 64
End: 2024-10-03
Attending: ORTHOPAEDIC SURGERY
Payer: COMMERCIAL

## 2024-10-03 ENCOUNTER — ANESTHESIA (OUTPATIENT)
Dept: OPERATING ROOM | Age: 64
End: 2024-10-03
Payer: COMMERCIAL

## 2024-10-03 ENCOUNTER — HOSPITAL ENCOUNTER (OUTPATIENT)
Age: 64
Setting detail: OBSERVATION
Discharge: HOME | End: 2024-10-04
Attending: ORTHOPAEDIC SURGERY | Admitting: ORTHOPAEDIC SURGERY
Payer: COMMERCIAL

## 2024-10-03 DIAGNOSIS — Z96.652 S/P TOTAL KNEE ARTHROPLASTY, LEFT: Primary | ICD-10-CM

## 2024-10-03 LAB
GLUCOSE BLD-MCNC: 160 MG/DL (ref 74–100)
GLUCOSE BLD-MCNC: 223 MG/DL (ref 74–100)

## 2024-10-03 PROCEDURE — 2580000003 HC RX 258

## 2024-10-03 PROCEDURE — G0378 HOSPITAL OBSERVATION PER HR: HCPCS

## 2024-10-03 PROCEDURE — 64447 NJX AA&/STRD FEMORAL NRV IMG: CPT

## 2024-10-03 PROCEDURE — 6360000002 HC RX W HCPCS: Performed by: PHYSICIAN ASSISTANT

## 2024-10-03 PROCEDURE — 82947 ASSAY GLUCOSE BLOOD QUANT: CPT

## 2024-10-03 PROCEDURE — 6360000002 HC RX W HCPCS: Performed by: NURSE ANESTHETIST, CERTIFIED REGISTERED

## 2024-10-03 PROCEDURE — 6360000002 HC RX W HCPCS

## 2024-10-03 PROCEDURE — 94761 N-INVAS EAR/PLS OXIMETRY MLT: CPT

## 2024-10-03 PROCEDURE — 2500000003 HC RX 250 WO HCPCS: Performed by: ORTHOPAEDIC SURGERY

## 2024-10-03 PROCEDURE — 2580000003 HC RX 258: Performed by: PHYSICIAN ASSISTANT

## 2024-10-03 PROCEDURE — 6370000000 HC RX 637 (ALT 250 FOR IP)

## 2024-10-03 PROCEDURE — 6370000000 HC RX 637 (ALT 250 FOR IP): Performed by: PHYSICIAN ASSISTANT

## 2024-10-03 PROCEDURE — 73560 X-RAY EXAM OF KNEE 1 OR 2: CPT

## 2024-10-03 PROCEDURE — 2500000003 HC RX 250 WO HCPCS

## 2024-10-03 PROCEDURE — 97162 PT EVAL MOD COMPLEX 30 MIN: CPT

## 2024-10-03 PROCEDURE — 94150 VITAL CAPACITY TEST: CPT

## 2024-10-03 PROCEDURE — 2500000003 HC RX 250 WO HCPCS: Performed by: PHYSICIAN ASSISTANT

## 2024-10-03 RX ORDER — SODIUM CHLORIDE 9 MG/ML
INJECTION, SOLUTION INTRAVENOUS PRN
Status: DISCONTINUED | OUTPATIENT
Start: 2024-10-03 | End: 2024-10-03 | Stop reason: HOSPADM

## 2024-10-03 RX ORDER — FENTANYL CITRATE 50 UG/ML
INJECTION, SOLUTION INTRAMUSCULAR; INTRAVENOUS
Status: DISCONTINUED | OUTPATIENT
Start: 2024-10-03 | End: 2024-10-03 | Stop reason: SDUPTHER

## 2024-10-03 RX ORDER — FAMOTIDINE 20 MG/1
40 TABLET, FILM COATED ORAL NIGHTLY PRN
Status: DISCONTINUED | OUTPATIENT
Start: 2024-10-03 | End: 2024-10-04 | Stop reason: HOSPADM

## 2024-10-03 RX ORDER — PROPOFOL 10 MG/ML
INJECTION, EMULSION INTRAVENOUS
Status: DISCONTINUED | OUTPATIENT
Start: 2024-10-03 | End: 2024-10-03 | Stop reason: SDUPTHER

## 2024-10-03 RX ORDER — PREGABALIN 75 MG/1
75 CAPSULE ORAL EVERY 12 HOURS PRN
Status: DISCONTINUED | OUTPATIENT
Start: 2024-10-03 | End: 2024-10-04 | Stop reason: HOSPADM

## 2024-10-03 RX ORDER — SODIUM CHLORIDE 9 MG/ML
INJECTION, SOLUTION INTRAMUSCULAR; INTRAVENOUS; SUBCUTANEOUS
Status: DISCONTINUED | OUTPATIENT
Start: 2024-10-03 | End: 2024-10-03 | Stop reason: SDUPTHER

## 2024-10-03 RX ORDER — DIMENHYDRINATE 50 MG
50 TABLET ORAL ONCE
Status: COMPLETED | OUTPATIENT
Start: 2024-10-03 | End: 2024-10-03

## 2024-10-03 RX ORDER — OXYCODONE HYDROCHLORIDE 5 MG/1
10 TABLET ORAL EVERY 4 HOURS PRN
Status: DISCONTINUED | OUTPATIENT
Start: 2024-10-03 | End: 2024-10-04 | Stop reason: HOSPADM

## 2024-10-03 RX ORDER — CEFAZOLIN SODIUM IN 0.9 % NACL 2 G/100 ML
2000 PLASTIC BAG, INJECTION (ML) INTRAVENOUS EVERY 8 HOURS
Status: COMPLETED | OUTPATIENT
Start: 2024-10-03 | End: 2024-10-04

## 2024-10-03 RX ORDER — ACETAMINOPHEN 325 MG/1
650 TABLET ORAL EVERY 6 HOURS
Status: DISCONTINUED | OUTPATIENT
Start: 2024-10-03 | End: 2024-10-04 | Stop reason: HOSPADM

## 2024-10-03 RX ORDER — SODIUM CHLORIDE 9 MG/ML
INJECTION, SOLUTION INTRAVENOUS PRN
Status: DISCONTINUED | OUTPATIENT
Start: 2024-10-03 | End: 2024-10-04 | Stop reason: HOSPADM

## 2024-10-03 RX ORDER — CEFAZOLIN SODIUM IN 0.9 % NACL 2 G/100 ML
2000 PLASTIC BAG, INJECTION (ML) INTRAVENOUS
Status: COMPLETED | OUTPATIENT
Start: 2024-10-03 | End: 2024-10-03

## 2024-10-03 RX ORDER — METOPROLOL SUCCINATE 25 MG/1
25 TABLET, EXTENDED RELEASE ORAL DAILY
Status: DISCONTINUED | OUTPATIENT
Start: 2024-10-03 | End: 2024-10-04 | Stop reason: HOSPADM

## 2024-10-03 RX ORDER — ACETAMINOPHEN 325 MG/1
650 TABLET ORAL ONCE
Status: COMPLETED | OUTPATIENT
Start: 2024-10-03 | End: 2024-10-03

## 2024-10-03 RX ORDER — KETOROLAC TROMETHAMINE 30 MG/ML
INJECTION, SOLUTION INTRAMUSCULAR; INTRAVENOUS
Status: DISCONTINUED | OUTPATIENT
Start: 2024-10-03 | End: 2024-10-03 | Stop reason: SDUPTHER

## 2024-10-03 RX ORDER — OXYCODONE HYDROCHLORIDE 5 MG/1
5 TABLET ORAL EVERY 4 HOURS PRN
Status: DISCONTINUED | OUTPATIENT
Start: 2024-10-03 | End: 2024-10-04 | Stop reason: HOSPADM

## 2024-10-03 RX ORDER — SODIUM CHLORIDE 9 MG/ML
INJECTION, SOLUTION INTRAVENOUS CONTINUOUS
Status: DISCONTINUED | OUTPATIENT
Start: 2024-10-03 | End: 2024-10-03 | Stop reason: HOSPADM

## 2024-10-03 RX ORDER — CALCIUM CARBONATE 500 MG/1
1 TABLET, CHEWABLE ORAL DAILY PRN
Status: DISCONTINUED | OUTPATIENT
Start: 2024-10-03 | End: 2024-10-04 | Stop reason: HOSPADM

## 2024-10-03 RX ORDER — ONDANSETRON 4 MG/1
4 TABLET, ORALLY DISINTEGRATING ORAL EVERY 8 HOURS PRN
Status: DISCONTINUED | OUTPATIENT
Start: 2024-10-03 | End: 2024-10-04 | Stop reason: HOSPADM

## 2024-10-03 RX ORDER — SODIUM CHLORIDE 0.9 % (FLUSH) 0.9 %
5-40 SYRINGE (ML) INJECTION PRN
Status: DISCONTINUED | OUTPATIENT
Start: 2024-10-03 | End: 2024-10-04 | Stop reason: HOSPADM

## 2024-10-03 RX ORDER — SODIUM CHLORIDE, SODIUM LACTATE, POTASSIUM CHLORIDE, CALCIUM CHLORIDE 600; 310; 30; 20 MG/100ML; MG/100ML; MG/100ML; MG/100ML
INJECTION, SOLUTION INTRAVENOUS CONTINUOUS
Status: DISCONTINUED | OUTPATIENT
Start: 2024-10-03 | End: 2024-10-03 | Stop reason: HOSPADM

## 2024-10-03 RX ORDER — POLYETHYLENE GLYCOL 3350 17 G/17G
17 POWDER, FOR SOLUTION ORAL DAILY
Status: DISCONTINUED | OUTPATIENT
Start: 2024-10-03 | End: 2024-10-04 | Stop reason: HOSPADM

## 2024-10-03 RX ORDER — BUPIVACAINE HYDROCHLORIDE 7.5 MG/ML
INJECTION, SOLUTION INTRASPINAL
Status: COMPLETED | OUTPATIENT
Start: 2024-10-03 | End: 2024-10-03

## 2024-10-03 RX ORDER — ONDANSETRON 2 MG/ML
INJECTION INTRAMUSCULAR; INTRAVENOUS
Status: DISCONTINUED | OUTPATIENT
Start: 2024-10-03 | End: 2024-10-03 | Stop reason: SDUPTHER

## 2024-10-03 RX ORDER — SODIUM CHLORIDE 0.9 % (FLUSH) 0.9 %
5-40 SYRINGE (ML) INJECTION EVERY 12 HOURS SCHEDULED
Status: DISCONTINUED | OUTPATIENT
Start: 2024-10-03 | End: 2024-10-03 | Stop reason: HOSPADM

## 2024-10-03 RX ORDER — ONDANSETRON 2 MG/ML
4 INJECTION INTRAMUSCULAR; INTRAVENOUS EVERY 6 HOURS PRN
Status: DISCONTINUED | OUTPATIENT
Start: 2024-10-03 | End: 2024-10-04 | Stop reason: HOSPADM

## 2024-10-03 RX ORDER — ASPIRIN 325 MG
325 TABLET, DELAYED RELEASE (ENTERIC COATED) ORAL DAILY
Status: DISCONTINUED | OUTPATIENT
Start: 2024-10-03 | End: 2024-10-04 | Stop reason: HOSPADM

## 2024-10-03 RX ORDER — SODIUM CHLORIDE 9 MG/ML
INJECTION, SOLUTION INTRAVENOUS CONTINUOUS
Status: DISCONTINUED | OUTPATIENT
Start: 2024-10-03 | End: 2024-10-04 | Stop reason: HOSPADM

## 2024-10-03 RX ORDER — SODIUM CHLORIDE 0.9 % (FLUSH) 0.9 %
5-40 SYRINGE (ML) INJECTION PRN
Status: DISCONTINUED | OUTPATIENT
Start: 2024-10-03 | End: 2024-10-03 | Stop reason: HOSPADM

## 2024-10-03 RX ORDER — OXYCODONE HYDROCHLORIDE 5 MG/1
5 CAPSULE ORAL EVERY 6 HOURS PRN
Qty: 20 CAPSULE | Refills: 0 | Status: SHIPPED | OUTPATIENT
Start: 2024-10-03 | End: 2024-10-08

## 2024-10-03 RX ORDER — SODIUM CHLORIDE 0.9 % (FLUSH) 0.9 %
5-40 SYRINGE (ML) INJECTION EVERY 12 HOURS SCHEDULED
Status: DISCONTINUED | OUTPATIENT
Start: 2024-10-03 | End: 2024-10-04 | Stop reason: HOSPADM

## 2024-10-03 RX ORDER — DIPHENHYDRAMINE HCL 25 MG
25 CAPSULE ORAL EVERY 6 HOURS PRN
Status: DISCONTINUED | OUTPATIENT
Start: 2024-10-03 | End: 2024-10-04 | Stop reason: HOSPADM

## 2024-10-03 RX ORDER — ROPIVACAINE HYDROCHLORIDE 5 MG/ML
INJECTION, SOLUTION EPIDURAL; INFILTRATION; PERINEURAL
Status: DISCONTINUED | OUTPATIENT
Start: 2024-10-03 | End: 2024-10-03 | Stop reason: SDUPTHER

## 2024-10-03 RX ORDER — HYDROCHLOROTHIAZIDE 25 MG/1
6.25 TABLET ORAL DAILY
Status: DISCONTINUED | OUTPATIENT
Start: 2024-10-03 | End: 2024-10-04 | Stop reason: HOSPADM

## 2024-10-03 RX ORDER — BUPIVACAINE/KETOROLAC/KETAMINE 150-60/50
SYRINGE (ML) INJECTION
Status: DISPENSED
Start: 2024-10-03 | End: 2024-10-03

## 2024-10-03 RX ORDER — ASPIRIN 325 MG
325 TABLET ORAL DAILY
Qty: 30 TABLET | Refills: 0 | Status: SHIPPED | OUTPATIENT
Start: 2024-10-03 | End: 2024-11-02

## 2024-10-03 RX ORDER — BISOPROLOL FUMARATE AND HYDROCHLOROTHIAZIDE 2.5; 6.25 MG/1; MG/1
1 TABLET ORAL DAILY
Status: DISCONTINUED | OUTPATIENT
Start: 2024-10-03 | End: 2024-10-03

## 2024-10-03 RX ORDER — LOSARTAN POTASSIUM 50 MG/1
50 TABLET ORAL DAILY
Status: DISCONTINUED | OUTPATIENT
Start: 2024-10-03 | End: 2024-10-04 | Stop reason: HOSPADM

## 2024-10-03 RX ORDER — ACETAMINOPHEN 500 MG
1000 TABLET ORAL ONCE
Status: DISCONTINUED | OUTPATIENT
Start: 2024-10-03 | End: 2024-10-03 | Stop reason: HOSPADM

## 2024-10-03 RX ORDER — DEXAMETHASONE SODIUM PHOSPHATE 10 MG/ML
INJECTION, SOLUTION INTRAMUSCULAR; INTRAVENOUS
Status: DISCONTINUED | OUTPATIENT
Start: 2024-10-03 | End: 2024-10-03 | Stop reason: SDUPTHER

## 2024-10-03 RX ORDER — GABAPENTIN 300 MG/1
300 CAPSULE ORAL ONCE
Status: COMPLETED | OUTPATIENT
Start: 2024-10-03 | End: 2024-10-03

## 2024-10-03 RX ORDER — BISACODYL 5 MG/1
5 TABLET, DELAYED RELEASE ORAL DAILY PRN
Status: DISCONTINUED | OUTPATIENT
Start: 2024-10-03 | End: 2024-10-04 | Stop reason: HOSPADM

## 2024-10-03 RX ORDER — BUPIVACAINE/KETOROLAC/KETAMINE 150-60/50
SYRINGE (ML) INJECTION PRN
Status: DISCONTINUED | OUTPATIENT
Start: 2024-10-03 | End: 2024-10-03 | Stop reason: ALTCHOICE

## 2024-10-03 RX ORDER — SENNOSIDES A AND B 8.6 MG/1
1 TABLET, FILM COATED ORAL DAILY PRN
Status: DISCONTINUED | OUTPATIENT
Start: 2024-10-03 | End: 2024-10-04 | Stop reason: HOSPADM

## 2024-10-03 RX ORDER — PREGABALIN 75 MG/1
75 CAPSULE ORAL EVERY 12 HOURS PRN
Qty: 28 CAPSULE | Refills: 0 | Status: SHIPPED | OUTPATIENT
Start: 2024-10-03 | End: 2024-10-17

## 2024-10-03 RX ORDER — METFORMIN HCL 500 MG
1000 TABLET, EXTENDED RELEASE 24 HR ORAL DAILY
Status: DISCONTINUED | OUTPATIENT
Start: 2024-10-03 | End: 2024-10-04 | Stop reason: HOSPADM

## 2024-10-03 RX ORDER — DIPHENHYDRAMINE HYDROCHLORIDE 50 MG/ML
25 INJECTION INTRAMUSCULAR; INTRAVENOUS EVERY 6 HOURS PRN
Status: DISCONTINUED | OUTPATIENT
Start: 2024-10-03 | End: 2024-10-04 | Stop reason: HOSPADM

## 2024-10-03 RX ORDER — PRAVASTATIN SODIUM 20 MG
40 TABLET ORAL DAILY
Status: DISCONTINUED | OUTPATIENT
Start: 2024-10-03 | End: 2024-10-04 | Stop reason: HOSPADM

## 2024-10-03 RX ORDER — LIDOCAINE HYDROCHLORIDE 20 MG/ML
INJECTION, SOLUTION EPIDURAL; INFILTRATION; INTRACAUDAL; PERINEURAL
Status: DISCONTINUED | OUTPATIENT
Start: 2024-10-03 | End: 2024-10-03 | Stop reason: SDUPTHER

## 2024-10-03 RX ADMIN — PHENYLEPHRINE HYDROCHLORIDE 100 MCG: 10 INJECTION INTRAVENOUS at 10:47

## 2024-10-03 RX ADMIN — METFORMIN HYDROCHLORIDE 1000 MG: 500 TABLET, EXTENDED RELEASE ORAL at 13:36

## 2024-10-03 RX ADMIN — PRAVASTATIN SODIUM 40 MG: 20 TABLET ORAL at 13:36

## 2024-10-03 RX ADMIN — PHENYLEPHRINE HYDROCHLORIDE 100 MCG: 10 INJECTION INTRAVENOUS at 10:36

## 2024-10-03 RX ADMIN — ACETAMINOPHEN 650 MG: 325 TABLET ORAL at 13:36

## 2024-10-03 RX ADMIN — OXYCODONE HYDROCHLORIDE 5 MG: 5 TABLET ORAL at 23:53

## 2024-10-03 RX ADMIN — GABAPENTIN 300 MG: 300 CAPSULE ORAL at 08:35

## 2024-10-03 RX ADMIN — SODIUM CHLORIDE, POTASSIUM CHLORIDE, SODIUM LACTATE AND CALCIUM CHLORIDE: 600; 310; 30; 20 INJECTION, SOLUTION INTRAVENOUS at 08:46

## 2024-10-03 RX ADMIN — ACETAMINOPHEN 650 MG: 325 TABLET ORAL at 08:35

## 2024-10-03 RX ADMIN — METOPROLOL SUCCINATE 25 MG: 25 TABLET, FILM COATED, EXTENDED RELEASE ORAL at 13:36

## 2024-10-03 RX ADMIN — DIMENHYDRINATE 50 MG: 50 TABLET ORAL at 08:36

## 2024-10-03 RX ADMIN — LOSARTAN POTASSIUM 50 MG: 50 TABLET, FILM COATED ORAL at 13:36

## 2024-10-03 RX ADMIN — PHENYLEPHRINE HYDROCHLORIDE 100 MCG: 10 INJECTION INTRAVENOUS at 10:55

## 2024-10-03 RX ADMIN — POLYETHYLENE GLYCOL 3350 17 G: 17 POWDER, FOR SOLUTION ORAL at 13:36

## 2024-10-03 RX ADMIN — FENTANYL CITRATE 100 MCG: 50 INJECTION INTRAMUSCULAR; INTRAVENOUS at 09:19

## 2024-10-03 RX ADMIN — SODIUM CHLORIDE, POTASSIUM CHLORIDE, SODIUM LACTATE AND CALCIUM CHLORIDE: 600; 310; 30; 20 INJECTION, SOLUTION INTRAVENOUS at 11:16

## 2024-10-03 RX ADMIN — DEXAMETHASONE SODIUM PHOSPHATE 10 MG: 10 INJECTION INTRAMUSCULAR; INTRAVENOUS at 09:23

## 2024-10-03 RX ADMIN — PHENYLEPHRINE HYDROCHLORIDE 100 MCG: 10 INJECTION INTRAVENOUS at 11:00

## 2024-10-03 RX ADMIN — PHENYLEPHRINE HYDROCHLORIDE 100 MCG: 10 INJECTION INTRAVENOUS at 11:36

## 2024-10-03 RX ADMIN — SODIUM CHLORIDE: 9 INJECTION, SOLUTION INTRAVENOUS at 13:02

## 2024-10-03 RX ADMIN — ASPIRIN 325 MG: 325 TABLET, COATED ORAL at 13:36

## 2024-10-03 RX ADMIN — BUPIVACAINE HYDROCHLORIDE IN DEXTROSE 13.5 MG: 7.5 INJECTION, SOLUTION SUBARACHNOID at 09:45

## 2024-10-03 RX ADMIN — PHENYLEPHRINE HYDROCHLORIDE 100 MCG: 10 INJECTION INTRAVENOUS at 11:25

## 2024-10-03 RX ADMIN — KETOROLAC TROMETHAMINE 15 MG: 30 INJECTION, SOLUTION INTRAMUSCULAR at 11:22

## 2024-10-03 RX ADMIN — PHENYLEPHRINE HYDROCHLORIDE 100 MCG: 10 INJECTION INTRAVENOUS at 10:46

## 2024-10-03 RX ADMIN — PROPOFOL 100 MCG/KG/MIN: 10 INJECTION, EMULSION INTRAVENOUS at 09:58

## 2024-10-03 RX ADMIN — Medication 2000 MG: at 16:41

## 2024-10-03 RX ADMIN — PHENYLEPHRINE HYDROCHLORIDE 100 MCG: 10 INJECTION INTRAVENOUS at 11:20

## 2024-10-03 RX ADMIN — Medication 2000 MG: at 09:34

## 2024-10-03 RX ADMIN — PHENYLEPHRINE HYDROCHLORIDE 100 MCG: 10 INJECTION INTRAVENOUS at 11:06

## 2024-10-03 RX ADMIN — ROPIVACAINE HYDROCHLORIDE 10 ML: 5 INJECTION EPIDURAL; INFILTRATION; PERINEURAL at 09:23

## 2024-10-03 RX ADMIN — ACETAMINOPHEN 650 MG: 325 TABLET ORAL at 20:34

## 2024-10-03 RX ADMIN — TRANEXAMIC ACID 1000 MG: 100 INJECTION, SOLUTION INTRAVENOUS at 11:24

## 2024-10-03 RX ADMIN — HYDROCHLOROTHIAZIDE 6.25 MG: 25 TABLET ORAL at 13:36

## 2024-10-03 RX ADMIN — ONDANSETRON 4 MG: 2 INJECTION, SOLUTION INTRAMUSCULAR; INTRAVENOUS at 11:42

## 2024-10-03 RX ADMIN — TRANEXAMIC ACID 1000 MG: 100 INJECTION, SOLUTION INTRAVENOUS at 10:02

## 2024-10-03 RX ADMIN — SODIUM CHLORIDE 10 ML: 9 INJECTION INTRAMUSCULAR; INTRAVENOUS; SUBCUTANEOUS at 09:23

## 2024-10-03 RX ADMIN — LIDOCAINE HYDROCHLORIDE 60 MG: 20 INJECTION, SOLUTION EPIDURAL; INFILTRATION; INTRACAUDAL; PERINEURAL at 09:58

## 2024-10-03 RX ADMIN — OXYCODONE HYDROCHLORIDE 5 MG: 5 TABLET ORAL at 17:57

## 2024-10-03 ASSESSMENT — PAIN SCALES - GENERAL
PAINLEVEL_OUTOF10: 0
PAINLEVEL_OUTOF10: 5
PAINLEVEL_OUTOF10: 4
PAINLEVEL_OUTOF10: 4
PAINLEVEL_OUTOF10: 0

## 2024-10-03 ASSESSMENT — PAIN DESCRIPTION - LOCATION
LOCATION: KNEE

## 2024-10-03 ASSESSMENT — PAIN DESCRIPTION - ORIENTATION
ORIENTATION: LEFT

## 2024-10-03 ASSESSMENT — PAIN - FUNCTIONAL ASSESSMENT
PAIN_FUNCTIONAL_ASSESSMENT: ACTIVITIES ARE NOT PREVENTED
PAIN_FUNCTIONAL_ASSESSMENT: NONE - DENIES PAIN
PAIN_FUNCTIONAL_ASSESSMENT: ACTIVITIES ARE NOT PREVENTED
PAIN_FUNCTIONAL_ASSESSMENT: NONE - DENIES PAIN

## 2024-10-03 ASSESSMENT — PAIN DESCRIPTION - FREQUENCY: FREQUENCY: INTERMITTENT

## 2024-10-03 ASSESSMENT — PAIN DESCRIPTION - DESCRIPTORS
DESCRIPTORS: BURNING
DESCRIPTORS: ACHING
DESCRIPTORS: ACHING

## 2024-10-03 ASSESSMENT — LIFESTYLE VARIABLES: SMOKING_STATUS: 0

## 2024-10-03 ASSESSMENT — PAIN DESCRIPTION - ONSET: ONSET: ON-GOING

## 2024-10-03 ASSESSMENT — PAIN DESCRIPTION - PAIN TYPE: TYPE: SURGICAL PAIN

## 2024-10-03 NOTE — ANESTHESIA PRE PROCEDURE
Department of Anesthesiology  Preprocedure Note       Name:  Hina Pollock   Age:  64 y.o.  :  1960                                          MRN:  [id number]         Date:  10/3/2024      Surgeon: Surgeon(s):  Shiv Tuttle MD    Procedure: Procedure(s):  KNEE TOTAL ARTHROPLASTY    Medications prior to admission:   Prior to Admission medications    Medication Sig Start Date End Date Taking? Authorizing Provider   losartan (COZAAR) 50 MG tablet TAKE 1 TABLET BY MOUTH DAILY 24  Yes Lupillo Schmitz MD   bisoprolol-hydroCHLOROthiazide (ZIAC) 2.5-6.25 MG per tablet Take 1 tablet by mouth daily 6/3/24  Yes Lupillo Schmitz MD   pravastatin (PRAVACHOL) 40 MG tablet Take 1 tablet by mouth daily 24  Yes Lupillo Schmitz MD   metFORMIN (GLUCOPHAGE-XR) 500 MG extended release tablet Take 2 tablets by mouth daily 24  Yes Lupillo Schmitz MD   Cholecalciferol (VITAMIN D3) 1000 units CAPS  10/10/23  Yes Provider, Historical, MD   MegaRed Omega-3 Krill Oil 500 MG CAPS  23  Yes Nando Blunt MD   Methylcobalamin (B-12) 5000 MCG TBDP  20  Yes Nando Blunt MD   Acetaminophen (TYLENOL ARTHRITIS PAIN PO) Take by mouth daily   Yes Nando Blunt MD   ascorbic acid (VITAMIN C) 500 MG tablet Take 1 tablet by mouth daily   Yes Nando Blunt MD   zinc gluconate 50 MG tablet Take 1 tablet by mouth daily   Yes Nando Blunt MD   Calcium Carbonate (CALCIUM 600 PO) Take by mouth   Yes Nando Blunt MD   calcium carbonate (TUMS) 500 MG chewable tablet Take 1 tablet by mouth daily as needed   Yes Nando Blunt MD   Multiple Vitamins-Minerals (THERAPEUTIC MULTIVITAMIN-MINERALS) tablet Take 1 tablet by mouth daily   Yes Nando Blunt MD   famotidine (PEPCID) 20 MG tablet Take 1 tablet by mouth 2 times daily  Patient taking differently: Take 2 tablets by mouth nightly as needed 5/13/15  Yes David Olguin MD   ferrous sulfate (IRON 325) 325 (65 Fe) MG tablet

## 2024-10-03 NOTE — DISCHARGE INSTRUCTIONS
Total Joint Replacement  Discharge Instructions    To prevent Clot formation, you have been placed on an anticoagulant  Surgical Site Care:  Dressing to remain clean, dry, and intact until follow up in office in 2 weeks  Sutures may be present and will likely be removed at 2 week follow up   Showering is permitted over dressing as long as dressing remains fully intact. Do no shower if dressing is peeling or compromised  Physical Therapy:  Full Weight Bearing Status  Precautions   Per Physical Therapy handout  Pain Medications  You were given pain medication  Wean off pain medications as you deem appropriate as long as pain is under control  Cold packs/Ice packs/Machine  May be used 3 times daily for 15-30 minutes as necessary  Be sure to have a barrier (cloth, clothing, towel) between the site and the ice pack to prevent frostbite  Contact [facility] office if  Increased redness, swelling, drainage of any kind, and/or pain to surgery site.  As well as new onset fevers and or chills.  These could signify an infection.  Calf or thigh tenderness to touch as well as increased swelling or redness.  This could signify a clot formation.  Numbness or tingling to an area around the incision site or below the incision site (toes).  Any rash appears, increased  or new onset nausea/vomiting occur.  This may indicate a reaction to a medication.   Phone # [phone] - [facility]  Follow up with Surgeon at scheduled appointment time.

## 2024-10-03 NOTE — PROGRESS NOTES
[facility] report given to DEBBIE Nguyen. Vitals remain stable, denies pain.    Discharge Criteria    Inpatients must meet Criteria 1 through 7. All other patients are either YES or N/A. If a NO is chosen then Anesthesia or Surgeon must be notified.      1.  Minimum 30 minutes after last dose of sedative medication.    Yes      2.  Systolic BP between 90 - 160. Diastolic BP between 60 - 90.    Yes      3.  Pulse between 60 - 120    Yes      4.  Respirations between 8 - 25.    Yes      5.  SpO2 92% - 100%.    Yes      6.  Able to cough and swallow or return to baseline function.    Yes      7.  Alert and oriented or return to baseline mental status.    Yes

## 2024-10-03 NOTE — PROGRESS NOTES
[facility]  Inpatient/Observation/Outpatient Rehabilitation    Date: 10/3/2024  Patient Name: Hina Pollock       [x] Inpatient Acute/Observation       : 1960     [x] Evaluation held by RN/Provider due to:  [x] Other: OT eval attempt x2 3:42 PM. Pt reports numbness. Check back later.     Therapist will attempt to see this patient, at our earliest opportunity.       Zane Cherry, OT Date: 10/3/2024

## 2024-10-03 NOTE — ANESTHESIA POSTPROCEDURE EVALUATION
Department of Anesthesiology  Postprocedure Note    Patient: Hina Pollock  MRN: [id number]  YOB: 1960  Date of evaluation: 10/3/2024    Procedure Summary       Date: 10/03/24 Room / Location: [place] [address] / [facility]    Anesthesia Start: 0936 Anesthesia Stop: 1154    Procedure: KNEE TOTAL ARTHROPLASTY (Left) Diagnosis:       Primary osteoarthritis of left knee      (Primary osteoarthritis of left knee [M17.12])    Surgeons: Shiv Tuttle MD Responsible Provider: Kesha Hannah APRN - CRNA    Anesthesia Type: spinal ASA Status: 3            Anesthesia Type: No value filed.    Ramya Phase I: Ramya Score: 10    Ramya Phase II:      Anesthesia Post Evaluation    Patient location during evaluation: PACU  Patient participation: complete - patient participated  Level of consciousness: awake and alert  Pain score: 2  Airway patency: patent  Nausea & Vomiting: no nausea and no vomiting  Cardiovascular status: blood pressure returned to baseline  Respiratory status: room air  Hydration status: euvolemic  Multimodal analgesia pain management approach  Pain management: adequate    No notable events documented.

## 2024-10-03 NOTE — PROGRESS NOTES
Physical Therapy  Facility/Department: [facility] MED SURG  Physical Therapy Initial Assessment    Name: Hina Pollock  : 1960  MRN: [id number]  Date of Service: 10/3/2024    Discharge Recommendations:  Continue to assess pending progress, Outpatient PT, Home with assist PRN          Patient Diagnosis(es): The encounter diagnosis was S/P total knee arthroplasty, left.  Past Medical History:  has a past medical history of CRF (chronic renal failure), Diabetes mellitus (HCC), Hyperglycemia, Hyperlipidemia, Hypertension, MVP (mitral valve prolapse), Obesity, and Renal calculi.  Past Surgical History:  has a past surgical history that includes  section (1991); Hysterectomy (2004); Cystoscopy (2008); Colonoscopy (2012); Colonoscopy (N/A, 2023); Total hip arthroplasty (Right, 2024); and Total knee arthroplasty (Left, 10/03/2024).    Assessment  Assessment: Patient is 64 year old female s/p L TKA who presents with decreased L LE strength and ROM, decreased functional mobility and endurance and decreased safety and balance during transfers and ambulation and would benefit from physical therapy to address all concerns and safely return to OF.  Treatment Diagnosis: Difficulty walking  Specific Instructions for Next Treatment: Once daily on weekends  Therapy Prognosis: Good  Decision Making: Medium Complexity  Requires PT Follow-Up: Yes  Activity Tolerance  Activity Tolerance: Patient tolerated evaluation without incident    Plan  Physical Therapy Plan  General Plan: 2 times a day 7 days a week  Specific Instructions for Next Treatment: Once daily on weekends  Current Treatment Recommendations: Strengthening, ROM, Balance training, Functional mobility training, Transfer training, Neuromuscular re-education, Stair training, Gait training, Home exercise program, Safety education & training, Patient/Caregiver education & training, Manual, Therapeutic activities, Endurance

## 2024-10-03 NOTE — PROGRESS NOTES
Pt arrived to unit from OR with a total left knee. Pt is on room air, respirations even. Vitals obtained. Pt oriented to room. Reviewed orders. Pt denies further needs at this time. Call light in reach. Care ongoing.

## 2024-10-03 NOTE — CARE COORDINATION
Case Management Assessment  Initial Evaluation    Date/Time of Evaluation: 10/3/2024 2:25 PM  Assessment Completed by: JAYNE Moran    If patient is discharged prior to next notation, then this note serves as note for discharge by case management.    Patient Name: Hina Pollock                   YOB: 1960  Diagnosis: Primary osteoarthritis of left knee [M17.12]  S/P total knee arthroplasty, left [Z96.652]                   Date / Time: 10/3/2024  8:05 AM    Patient Admission Status: Observation   Readmission Risk (Low < 19, Mod (19-27), High > 27): No data recorded  Current PCP: Lupillo Schmitz MD  PCP verified by ? Yes    Chart Reviewed: Yes      History Provided by: Patient  Patient Orientation: Alert and Oriented, Person, Place, Situation, Self    Patient Cognition: Alert    Hospitalization in the last 30 days (Readmission):  No    If yes, Readmission Assessment in  Navigator will be completed.    Advance Directives:      Code Status: Full Code   Patient's Primary Decision Maker is: Legal Next of Kin    Primary Decision Maker: Hemal Pollock - Spouse - [phone]    Discharge Planning:    Patient lives with: Spouse/Significant Other Type of Home: House  Primary Care Giver: Self  Patient Support Systems include: Spouse/Significant Other, Family Members   Current Financial resources: Other (Comment) (commercial)  Current community resources: None  Current services prior to admission: None            Current DME:              Type of Home Care services:  PT    ADLS  Prior functional level: Independent in ADLs/IADLs  Current functional level: Assistance with the following:    PT AM-PAC:   /24  OT AM-PAC:   /24    Family can provide assistance at DC: Yes  Would you like Case Management to discuss the discharge plan with any other family members/significant others, and if so, who? Yes  Plans to Return to Present Housing: Yes  Other Identified Issues/Barriers to RETURNING to current housing:  none  Potential Assistance needed at discharge: Durable Medical Equipment, Outpatient PT/OT            Potential DME: Walker  Patient expects to discharge to: House  Plan for transportation at discharge: Family    Financial    Payor: AET[employer] / Plan: [employer]ET[employer] / Product Type: *No Product type* /     Does insurance require precert for SNF: Yes    Potential assistance Purchasing Medications: No  Meds-to-Beds request:        [facility] [id number] [facility] [address] [facility] [phone] - F [phone]  [address] [facility] [address]  Phone: [phone] Fax: [phone]      Notes:    Factors facilitating achievement of predicted outcomes: Family support, Motivated, Cooperative, Pleasant, and Has needed Durable Medical Equipment at home    Barriers to discharge: Pain    Additional Case Management Notes: Discussed discharge plans with the patient and the spouse.  Patient is  and lives at home with her .  She has a walker for discharge. Both the patient & spouse do the household chores.  She was independent with her ADL's prior to surgery. Patient manages her own medications and drives.  She has no outside services in the home.  The discharge plan is go home with outpatient PT at [facility].  She is set up for 10/7 for her first PT appointment.    The Plan for Transition of Care is related to the following treatment goals of Primary osteoarthritis of left knee [M17.12]  S/P total knee arthroplasty, left [Z96.652]    Transportation/Food Security/Housekeeping Addressed: No issues identified or concerns.    The Patient and/or Patient Representative Agree with the Discharge Plan?  Yes    The discharge plan is go home with outpatient PT at [facility].  She is set up for 10/7 for her first PT appointment.    JAYNE Moran  Case Management Department  Ph: [phone]

## 2024-10-03 NOTE — ANESTHESIA PROCEDURE NOTES
Spinal Block    Patient location during procedure: OR  End time: 10/3/2024 9:50 AM  Reason for block: primary anesthetic  Staffing  Performed: resident/CRNA   Resident/CRNA: Lani Walker APRN - CRNA  Performed by: Kesha Hannah APRN - CRNA  Authorized by: Lani Walker APRN - CRNA    Spinal Block  Patient position: sitting  Prep: ChloraPrep  Patient monitoring: continuous pulse ox, frequent blood pressure checks and oxygen  Approach: midline  Location: L3/L4  Provider prep: mask and sterile gloves  Local infiltration: lidocaine  Needle  Needle type: Pencan   Needle gauge: 25 G  Needle length: 3.5 in  Assessment  Sensory level: T8  Swirl obtained: Yes  CSF: clear  Attempts: 2  Hemodynamics: stable  Preanesthetic Checklist  Completed: patient identified, IV checked, site marked, risks and benefits discussed, surgical/procedural consents, equipment checked, pre-op evaluation, timeout performed, anesthesia consent given, oxygen available, monitors applied/VS acknowledged, fire risk safety assessment completed and verbalized and blood product R/B/A discussed and consented

## 2024-10-03 NOTE — PROGRESS NOTES
Called office regarding consult.     [Classification of primary headache syndrome based on latest version of International Classification of  Headache Disorders was performed] : Classification of primary headache syndrome based on latest version of International Classification of Headache Disorders was performed: Yes [Functional disability based on clinical history and/or age appropriate disability scale assessed] : Functional disability based on clinical history and/or age appropriate disability scale assessed: Yes [Overuse of OTC and prescribed analgesics assessed] : Overuse of OTC and prescribed analgesics assessed: Yes [Lifestyle factors including diet, exercise and sleep hygiene discussed] : Lifestyle factors including diet, exercise and sleep hygiene discussed: Yes [Referral to behavioral health for frequent headaches discussed] : Referral to behavioral health for frequent headaches discussed: Yes [Treatment plan for headache including  pharmacological (abortive and preventive) and nonpharmacological (nutraceutical and bio-behavioral) interventions] : Treatment plan for headache including  pharmacological (abortive and preventive) and nonpharmacological (nutraceutical and bio-behavioral) interventions: Yes

## 2024-10-03 NOTE — OP NOTE
joint capsule was then closed with #2 stratafix. Final sponge and needle counts were correct. The skin and subcutaneous tissue were then closed with 2-0 monocryl and 3-0 stratafix. Sterile dressings of Prineo, 4x4, and tegaderm were applied. Patient was awakened and transferred to the patient bed. Patient was taken to PACU in stable condition.      Disposition:    Admit for pain control and IV antibiotics. PT/OT. IV antibiotics x24 hrs. WBAT. XR left knee in PACU. PO pain control. DVT ppx  mg daily, SCDs, TEDS. Nothing behind knee while lying in bed. Ice and elevation to decrease swelling.  Dressing to remain in place until follow-up in 2 weeks.  Anticipate d/c home in AM.       Shiv Tuttle MD  Orthopaedic Surgeon  [facility]  10/3/2024  11:47 AM

## 2024-10-03 NOTE — ANESTHESIA PROCEDURE NOTES
Peripheral Block    Patient location during procedure: pre-op  Reason for block: post-op pain management and at surgeon's request  Start time: 10/3/2024 9:18 AM  End time: 10/3/2024 9:23 AM  Staffing  Resident/CRNA: Kesha Hannah APRN - CRNA  Performed by: Kesha Hannah APRN - CRNA  Authorized by: Lani Walker APRN - CRNA    Preanesthetic Checklist  Completed: patient identified, IV checked, site marked, risks and benefits discussed, surgical/procedural consents, equipment checked, pre-op evaluation, timeout performed, anesthesia consent given, oxygen available, monitors applied/VS acknowledged, fire risk safety assessment completed and verbalized and blood product R/B/A discussed and consented  Peripheral Block   Patient position: supine  Prep: ChloraPrep  Provider prep: mask and sterile gloves  Patient monitoring: continuous pulse ox, IV access and responsive to questions  Block type: Saphenous  Laterality: left  Injection technique: single-shot  Guidance: ultrasound guided    Needle   Needle type: Other   Needle gauge: 22 G  Needle localization: ultrasound guidance  Needle length: 8 cmOther needle type: Pajunk  Assessment   Injection assessment: negative aspiration for heme, no paresthesia on injection, local visualized surrounding nerve on ultrasound, no intravascular symptoms and low pressure verified by pressure monitor  Paresthesia pain: none  Slow fractionated injection: yes  Hemodynamics: stable  Outcomes: uncomplicated and patient tolerated procedure well

## 2024-10-04 VITALS
TEMPERATURE: 97.5 F | HEART RATE: 83 BPM | DIASTOLIC BLOOD PRESSURE: 72 MMHG | OXYGEN SATURATION: 99 % | HEIGHT: 66 IN | SYSTOLIC BLOOD PRESSURE: 121 MMHG | RESPIRATION RATE: 15 BRPM | BODY MASS INDEX: 29.77 KG/M2 | WEIGHT: 185.2 LBS

## 2024-10-04 PROBLEM — E11.9 TYPE 2 DIABETES MELLITUS WITHOUT COMPLICATION, WITHOUT LONG-TERM CURRENT USE OF INSULIN (HCC): Status: ACTIVE | Noted: 2024-10-04

## 2024-10-04 LAB
ANION GAP SERPL CALCULATED.3IONS-SCNC: 7 MMOL/L (ref 9–16)
BUN SERPL-MCNC: 20 MG/DL (ref 8–23)
BUN/CREAT SERPL: 20 (ref 9–20)
CALCIUM SERPL-MCNC: 9 MG/DL (ref 8.6–10.4)
CHLORIDE SERPL-SCNC: 106 MMOL/L (ref 98–107)
CO2 SERPL-SCNC: 25 MMOL/L (ref 20–31)
CREAT SERPL-MCNC: 1 MG/DL (ref 0.5–0.9)
GFR, ESTIMATED: 63 ML/MIN/1.73M2
GLUCOSE BLD-MCNC: 132 MG/DL (ref 74–100)
GLUCOSE BLD-MCNC: 151 MG/DL (ref 74–100)
GLUCOSE SERPL-MCNC: 142 MG/DL (ref 74–99)
HCT VFR BLD AUTO: 35.1 % (ref 36.3–47.1)
HGB BLD-MCNC: 12.1 G/DL (ref 11.9–15.1)
POTASSIUM SERPL-SCNC: 4.3 MMOL/L (ref 3.7–5.3)
SODIUM SERPL-SCNC: 138 MMOL/L (ref 136–145)

## 2024-10-04 PROCEDURE — 85018 HEMOGLOBIN: CPT

## 2024-10-04 PROCEDURE — 97116 GAIT TRAINING THERAPY: CPT

## 2024-10-04 PROCEDURE — 85014 HEMATOCRIT: CPT

## 2024-10-04 PROCEDURE — G0378 HOSPITAL OBSERVATION PER HR: HCPCS

## 2024-10-04 PROCEDURE — 6360000002 HC RX W HCPCS: Performed by: PHYSICIAN ASSISTANT

## 2024-10-04 PROCEDURE — 82947 ASSAY GLUCOSE BLOOD QUANT: CPT

## 2024-10-04 PROCEDURE — 2580000003 HC RX 258: Performed by: PHYSICIAN ASSISTANT

## 2024-10-04 PROCEDURE — 94761 N-INVAS EAR/PLS OXIMETRY MLT: CPT

## 2024-10-04 PROCEDURE — 6370000000 HC RX 637 (ALT 250 FOR IP): Performed by: PHYSICIAN ASSISTANT

## 2024-10-04 PROCEDURE — 36415 COLL VENOUS BLD VENIPUNCTURE: CPT

## 2024-10-04 PROCEDURE — 80048 BASIC METABOLIC PNL TOTAL CA: CPT

## 2024-10-04 PROCEDURE — 97110 THERAPEUTIC EXERCISES: CPT

## 2024-10-04 PROCEDURE — 97166 OT EVAL MOD COMPLEX 45 MIN: CPT

## 2024-10-04 RX ADMIN — ACETAMINOPHEN 650 MG: 325 TABLET ORAL at 06:52

## 2024-10-04 RX ADMIN — METFORMIN HYDROCHLORIDE 1000 MG: 500 TABLET, EXTENDED RELEASE ORAL at 10:28

## 2024-10-04 RX ADMIN — SODIUM CHLORIDE, PRESERVATIVE FREE 10 ML: 5 INJECTION INTRAVENOUS at 10:29

## 2024-10-04 RX ADMIN — OXYCODONE HYDROCHLORIDE 5 MG: 5 TABLET ORAL at 06:52

## 2024-10-04 RX ADMIN — Medication 2000 MG: at 01:28

## 2024-10-04 RX ADMIN — ASPIRIN 325 MG: 325 TABLET, COATED ORAL at 10:28

## 2024-10-04 RX ADMIN — METOPROLOL SUCCINATE 25 MG: 25 TABLET, FILM COATED, EXTENDED RELEASE ORAL at 10:28

## 2024-10-04 RX ADMIN — PRAVASTATIN SODIUM 40 MG: 20 TABLET ORAL at 10:28

## 2024-10-04 RX ADMIN — LOSARTAN POTASSIUM 50 MG: 50 TABLET, FILM COATED ORAL at 10:28

## 2024-10-04 RX ADMIN — ACETAMINOPHEN 650 MG: 325 TABLET ORAL at 01:24

## 2024-10-04 RX ADMIN — POLYETHYLENE GLYCOL 3350 17 G: 17 POWDER, FOR SOLUTION ORAL at 10:29

## 2024-10-04 RX ADMIN — HYDROCHLOROTHIAZIDE 6.25 MG: 25 TABLET ORAL at 10:28

## 2024-10-04 ASSESSMENT — PAIN DESCRIPTION - FREQUENCY: FREQUENCY: CONTINUOUS

## 2024-10-04 ASSESSMENT — PAIN - FUNCTIONAL ASSESSMENT: PAIN_FUNCTIONAL_ASSESSMENT: ACTIVITIES ARE NOT PREVENTED

## 2024-10-04 ASSESSMENT — PAIN DESCRIPTION - DESCRIPTORS: DESCRIPTORS: BURNING;ACHING

## 2024-10-04 ASSESSMENT — PAIN DESCRIPTION - PAIN TYPE: TYPE: SURGICAL PAIN

## 2024-10-04 ASSESSMENT — PAIN DESCRIPTION - ORIENTATION: ORIENTATION: LEFT

## 2024-10-04 ASSESSMENT — PAIN SCALES - GENERAL: PAINLEVEL_OUTOF10: 4

## 2024-10-04 ASSESSMENT — PAIN DESCRIPTION - ONSET: ONSET: ON-GOING

## 2024-10-04 ASSESSMENT — PAIN DESCRIPTION - LOCATION: LOCATION: KNEE

## 2024-10-04 NOTE — PROGRESS NOTES
Occupational Therapy  Facility/Department: [facility] MED SURG  Occupational Therapy Initial Assessment    Name: Hina Pollock  : 1960  MRN: [id number]  Date of Service: 10/4/2024    Discharge Recommendations:  Continue to assess pending progress          Patient Diagnosis(es): The encounter diagnosis was S/P total knee arthroplasty, left.  Past Medical History:  has a past medical history of CRF (chronic renal failure), Diabetes mellitus (HCC), Hyperglycemia, Hyperlipidemia, Hypertension, MVP (mitral valve prolapse), Obesity, and Renal calculi.  Past Surgical History:  has a past surgical history that includes  section (1991); Hysterectomy (2004); Cystoscopy (2008); Colonoscopy (2012); Colonoscopy (N/A, 2023); Total hip arthroplasty (Right, 2024); and Total knee arthroplasty (Left, 10/03/2024).    Treatment Diagnosis: Weakness      Assessment  Performance deficits / Impairments: Decreased functional mobility ;Decreased endurance;Decreased ADL status;Decreased balance;Decreased strength;Decreased high-level IADLs  Assessment: 63 y/o F admitted to T for elective TKA resulting in incresaed need for assist during ADL. Patient would benefit from OT services to address and educate on AE/DME and basic home safety to ensure safe return home.  Treatment Diagnosis: Weakness  Prognosis: Good  Decision Making: Medium Complexity  REQUIRES OT FOLLOW-UP: Yes     Plan  Occupational Therapy Plan  Times Per Day: Once a day  Days Per Week: 7 Days  Current Treatment Recommendations: Strengthening, Balance training, Functional mobility training, Endurance training, Safety education & training, Patient/Caregiver education & training, Equipment evaluation, education, & procurement, Self-Care / ADL    Restrictions  Restrictions/Precautions  Restrictions/Precautions: Fall Risk, General Precautions, Weight Bearing  Lower Extremity Weight Bearing Restrictions  Left Lower Extremity Weight  WFL  Orientation  Overall Orientation Status: Within Functional Limits                  Education Given To: Patient  Education Provided: Role of Therapy;Plan of Care  Education Method: Verbal  Barriers to Learning: None  Education Outcome: Verbalized understanding                       AM-PAC - ADL  AM-PAC Daily Activity - Inpatient   How much help is needed for putting on and taking off regular lower body clothing?: A Little  How much help is needed for bathing (which includes washing, rinsing, drying)?: A Little  How much help is needed for toileting (which includes using toilet, bedpan, or urinal)?: A Little  How much help is needed for putting on and taking off regular upper body clothing?: A Little  How much help is needed for taking care of personal grooming?: A Little  How much help for eating meals?: None  AM-PAC Inpatient Daily Activity Raw Score: 19  AM-PAC Inpatient ADL T-Scale Score : 40.22  ADL Inpatient CMS 0-100% Score: 42.8  ADL Inpatient CMS G-Code Modifier : CK      Goals  Short Term Goals  Time Frame for Short Term Goals: 21 visits  Short Term Goal 1: Patient to complete ADL routine c mod I to ensure safe and indep return home.  Short Term Goal 2: Patient to be educated on d/c folder, AE/DME and home safety to ensure safe return home.      Therapy Time   Individual Concurrent Group Co-treatment   Time In 0940         Time Out 0955         Minutes 15                 Fiorella Walden OTR/L

## 2024-10-04 NOTE — PLAN OF CARE
Problem: Chronic Conditions and Co-morbidities  Goal: Patient's chronic conditions and co-morbidity symptoms are monitored and maintained or improved  10/4/2024 1045 by Philly Tuttle RN  Outcome: Completed     Problem: Discharge Planning  Goal: Discharge to home or other facility with appropriate resources  10/4/2024 1045 by Philly Tuttle RN  Outcome: Completed     Problem: Safety - Adult  Goal: Free from fall injury  10/4/2024 1045 by Philly Tuttle RN  Outcome: Completed     Problem: ABCDS Injury Assessment  Goal: Absence of physical injury  10/4/2024 1045 by Philly Tuttle RN  Outcome: Completed     Problem: Pain  Goal: Verbalizes/displays adequate comfort level or baseline comfort level  10/4/2024 1045 by Philly Tuttle RN  Outcome: Completed

## 2024-10-04 NOTE — DISCHARGE INSTR - DIET
Good nutrition is important when healing from an illness, injury, or surgery.  Follow any nutrition recommendations given to you during your hospital stay.   If you were given an oral nutrition supplement while in the hospital, continue to take this supplement at home.  You can take it with meals, in-between meals, and/or before bedtime. These supplements can be purchased at most local grocery stores, pharmacies, and chain [employer]-stores.   If you have any questions about your diet or nutrition, call the hospital and ask for the dietitian.    *General, carb control diet

## 2024-10-04 NOTE — PROGRESS NOTES
Entered room and patient is resting in the chair. Writer and another staff member assisted patient to bedside commode and back to bed. Vitals and assessment completed at this time, see flowsheet for details. A&O x4. Patient rated pain 4/10 patient provided ice and has scheduled Tylenol. Dressing is clean, dry, and intact. Pedal pulse is 2+.  Bilateral foot compression pumps placed on. Patient denies any further needs at this time. Call light in reach and bed alarm on.

## 2024-10-04 NOTE — PROGRESS NOTES
Chris Sharma M.D.  Internal Medicine PA/NP Consultation Note    Patient: Hina Pollock  Date of Admission: 10/3/2024  8:05 AM  Date of Evaluation: 10/4/2024      SUBJECTIVE:    Hina Pollock is a 64 y.o. female who is seen today for post op medical management consultation at the request of  Dr. Shiv Tuttle.   She is POD # 2 s/p left TKR.  Her pain is well controlled with current medications.  She is tolerating diet without any nausea, vomiting or diarrhea.  Last BM was prior to surgery.  She denies any chest pain, palpitations or shortness of breath.  She has been afebrile over the past 24 hrs.  She is doing well with PT.      OBJECTIVE:    Vitals:   Temp: 97.5 °F (36.4 °C)  BP: 121/72  Respirations: 16  Pulse: 83  SpO2: 99 %    24HR INTAKE/OUTPUT:    Intake/Output Summary (Last 24 hours) at 10/4/2024 0659  Last data filed at 10/4/2024 0650  Gross per 24 hour   Intake 3463.12 ml   Output 1500 ml   Net 1963.12 ml      -----------------------------------------------------------------    Exam:  GEN:    Awake, alert and oriented x3.   EYES:  EOMI, pupils equal   NECK: Supple. No lymphadenopathy.  No carotid bruit  CVS:    regular rate and rhythm, no audible murmur  PULM:  CTA, no wheezes, rales or rhonchi, no acute respiratory distress  ABD:    Bowels sounds normal.  Abdomen is soft.  No distention.  no tenderness to palpation.   EXT:    Non-pitting  edema in the LLE .  No calf tenderness.   NEURO: Moves all extremities.  Motor and sensory are grossly intact  SKIN:  Incision is dressed with minimal drainage.       DATA:  Complete Blood Count:   Recent Labs     10/04/24  0616   HGB 12.1   HCT 35.1*        Recent Blood Glucose Levels:   Recent Labs     10/04/24  0616   GLUCOSE 142*        Comprehensive Metabolic Profile:   Recent Labs     10/04/24  0616      K 4.3      CO2 25   BUN 20   CREATININE 1.0*   GLUCOSE 142*   CALCIUM 9.0        Radiology/Imaging:  XR KNEE LEFT (1-2 VIEWS)   Final Result   No acute  complication status post left knee arthroplasty.               MEDICAL DECISION MAKING:  Hospitalist consult for Post op medical management  Post op day # 1 s/p left TKR  Treatment plan:   Continue current physical and occupational therapy  Continue current treatment  Medications:  Pain control per Ortho:  Oxycodone  DVT prophyaxis: ASA per Ortho  Test interpretation:  My independent preop ECG interpretation: Normal sinus rhythm, normal axis, normal intervals, no ischemic ST changes  All current lab and imaging data were reviewed  Management and/or test interpretation  discussed with nursing staff, case management and Esther GREGORY  Shared decision making: All test results, treatment options and disposition options were discussed with the patient today  Social determinants of health that may impact management: none  Disposition: Discharge plan is home with outpatient P.T.      Chris Sharma MD , M.D.  10/4/2024  6:59 AM

## 2024-10-04 NOTE — PLAN OF CARE
Problem: Chronic Conditions and Co-morbidities  Goal: Patient's chronic conditions and co-morbidity symptoms are monitored and maintained or improved  Outcome: Progressing  Flowsheets (Taken 10/4/2024 0229)  Care Plan - Patient's Chronic Conditions and Co-Morbidity Symptoms are Monitored and Maintained or Improved: Monitor and assess patient's chronic conditions and comorbid symptoms for stability, deterioration, or improvement     Problem: Discharge Planning  Goal: Discharge to home or other facility with appropriate resources  Outcome: Progressing  Flowsheets (Taken 10/4/2024 0229)  Discharge to home or other facility with appropriate resources: Identify barriers to discharge with patient and caregiver     Problem: Safety - Adult  Goal: Free from fall injury  Outcome: Progressing  Flowsheets (Taken 10/4/2024 0229)  Free From Fall Injury: Instruct family/caregiver on patient safety     Problem: ABCDS Injury Assessment  Goal: Absence of physical injury  Outcome: Progressing  Flowsheets (Taken 10/4/2024 0229)  Absence of Physical Injury: Implement safety measures based on patient assessment     Problem: Pain  Goal: Verbalizes/displays adequate comfort level or baseline comfort level  Outcome: Progressing  Flowsheets (Taken 10/4/2024 0229)  Verbalizes/displays adequate comfort level or baseline comfort level:   Encourage patient to monitor pain and request assistance   Assess pain using appropriate pain scale   Administer analgesics based on type and severity of pain and evaluate response   Implement non-pharmacological measures as appropriate and evaluate response

## 2024-10-04 NOTE — PROGRESS NOTES
Writer to bedside to complete morning assessment. Upon entry to room, pt in bed eyes closed, respirations even and unlabored while on room air. Vitals obtained and assessment completed, see flow sheet for details. Pt is A&Ox4. Pt complaining of pain, prn pain medication to be given shortly. Lung sounds are clear throughout. Left knee dressing is clean, dry and intact with no drainage noted. Pt updated on plan of care and whiteboard updated. Pt encouraged to use incentive spirometer and pt demonstration completed. Pt denies further needs from writer at this time. Call light in reach. Care ongoing.

## 2024-10-04 NOTE — DISCHARGE SUMMARY
Orthopaedic Discharge Summary      Patient Identification:   Hina Pollock   : 1960  MRN: [id number]   Account: [id number]      Patient's PCP: Lupillo Schmitz MD    Admit Date: 10/3/2024     Discharge Date:   10/4/2024    Admitting Physician: Shiv Tuttle MD     Discharge Physician: PRERNA Palmer     Discharge Diagnoses:    Active Hospital Problems    Diagnosis Date Noted    Type 2 diabetes mellitus without complication, without long-term current use of insulin (HCC) [E11.9] 10/04/2024    S/P total knee arthroplasty, left [Z96.652] 10/03/2024    Essential hypertension [I10] 2017       The patient was seen and examined on day of discharge and this discharge summary is in conjunction with any daily progress note from day of discharge.    Operation Performed:  left total knee arthroplasty     Hospital Course:   Hina Pollock is a 64 y.o. female admitted to [facility] on 10/3/2024 for pain control and IV antibiotics s/p left total knee arthroplasty.    Post-operatively the patient’s diet was advanced as tolerated and their dressing remained clean, dry and intact with no signs of infection.  The patient remained neurovascularly intact in the lower extremity and had intact pulses distally.  Patient’s calf remained soft and showed no evidence of DVT.  The patient was able to move their leg and ankle/foot without any problems post-operatively.  Physical therapy and occupational therapy were consulted and began working with the patient post-operatively.  The patient progressed with PT/OT as would be expected and continued to improve through their stay.  The patients pain was initially controlled with IV medications but we were able to transition to oral pain medications soon after arrival to the floor and their pain remained under good control through their hospital stay.  From a medical standpoint the patient remained stable and continued to have the medicine team follow throughout their stay.  The

## 2024-10-04 NOTE — CONSULTS
ASPIRIN) 325 MG tablet Take 1 tablet by mouth daily 10/3/24 11/2/24 Yes Bambi Araiza PA   losartan (COZAAR) 50 MG tablet TAKE 1 TABLET BY MOUTH DAILY 8/1/24  Yes Lupillo Schmitz MD   bisoprolol-hydroCHLOROthiazide (ZIAC) 2.5-6.25 MG per tablet Take 1 tablet by mouth daily 6/3/24  Yes Lupillo Schmitz MD   pravastatin (PRAVACHOL) 40 MG tablet Take 1 tablet by mouth daily 5/29/24  Yes Lupillo Schmitz MD   metFORMIN (GLUCOPHAGE-XR) 500 MG extended release tablet Take 2 tablets by mouth daily 5/28/24  Yes Lupillo Schmitz MD   ferrous sulfate (IRON 325) 325 (65 Fe) MG tablet Take 1 tablet by mouth 2 times daily 3/13/24 10/3/24 Yes Srinivasan Carvajal MD   Cholecalciferol (VITAMIN D3) 1000 units CAPS Take 1 capsule by mouth Daily 10/10/23  Yes Provider, Historical, MD   MegaRed Omega-3 Krill Oil 500 MG CAPS Take 1 capsule by mouth Daily 12/1/23  Yes Nando Blunt MD   Methylcobalamin (B-12) 5000 MCG TBDP Take 1 tablet by mouth Daily 1/1/20  Yes Nando Blunt MD   Acetaminophen (TYLENOL ARTHRITIS PAIN PO) Take by mouth daily   Yes Nando Blunt MD   ascorbic acid (VITAMIN C) 500 MG tablet Take 1 tablet by mouth daily   Yes Nando Blunt MD   zinc gluconate 50 MG tablet Take 1 tablet by mouth daily   Yes Nando Blunt MD   Calcium Carbonate (CALCIUM 600 PO) Take 1 tablet by mouth Daily   Yes Nando Blunt MD   calcium carbonate (TUMS) 500 MG chewable tablet Take 1 tablet by mouth daily as needed   Yes ProviderNando MD   Multiple Vitamins-Minerals (THERAPEUTIC MULTIVITAMIN-MINERALS) tablet Take 1 tablet by mouth daily   Yes Nando Blunt MD   famotidine (PEPCID) 20 MG tablet Take 1 tablet by mouth 2 times daily  Patient taking differently: Take 2 tablets by mouth Daily 5/13/15  Yes David Olguin MD       Allergies:  Meloxicam    Social History:   TOBACCO:   reports that she has never smoked. She has never used smokeless tobacco.  ETOH:   reports no history of  7.5 09/16/2024    HGB 14.0 09/16/2024    MCV 94.6 09/16/2024     09/16/2024      Lab Results   Component Value Date    GLUCOSE 99 09/16/2024    BUN 20 09/16/2024    CREATININE 1.0 (H) 09/16/2024     09/16/2024    K 3.9 09/16/2024    CALCIUM 9.8 09/16/2024     09/16/2024    CO2 30 09/16/2024         ASSESSMENT:      Post-op medical management of:        Principal Problem:    S/P total knee arthroplasty, left  Active Problems:    Essential hypertension    Type 2 diabetes mellitus without complication, without long-term current use of insulin (HCC)  Resolved Problems:    * No resolved hospital problems. *    s/p left total Knee arthroplasty    PLAN:    Primary Problem(s): S/P total knee arthroplasty, left  Differential diagnoses: Osteoarthritis  Condition is a self-limited or minor problem  Condition is stable  Treatment plan:   Appreciate orthopedics  Weightbearing as tolerated  Ice therapy  PT OT  Wound care  Incentive spirometry  Monitor labs and replace electrolytes  Imaging: no further imaging studies ordered today  Medications:   Ancef  Aspirin  Oxycodone as needed  Lyrica as needed  Medication Monitoring / High Risk Medications: none      Hypertension  Condition is a chronic stable condition  Treatment plan: Continue current treatment  Imaging: no further imaging studies ordered today  Medications:   Continue losartan  Continue metoprolol  Continue hydrochlorothiazide      Type 2 diabetes  Condition is a chronic stable condition  Treatment plan:   POC glucose AC and HS  Diabetic diet  Imaging: no further imaging studies ordered today  Medications: Continue metformin    Nutrition status:   at risk for malnutrition  Dietician consult initiated    Hospital Prophylaxis:   DVT: SCD's   Stress Ulcer: H2 Blocker     MDM Data:   Test interpretation:  My independent EKG interpretation: N/A  My independent X-ray interpretation: N/A  Management and/or test interpretation discussed with orthopedic surgeon

## 2024-10-04 NOTE — PROGRESS NOTES
Pt educated on discharge instructions at this time with pt and  at bedside. Pt educated on new medications and possible side effects. Pt also educated on using incentive spirometer and using CHG soap at home to prevent infection, pt verbalized understanding. Pt's IV removed and dressing applied. Pt getting dressed at this time.

## 2024-10-04 NOTE — DISCHARGE INSTR - ACTIVITY
As tolerated with walker, and work with PT  No heavy lifting, pushing, pulling with the implant side for 2 months, and no driving while on analgesics

## 2024-10-04 NOTE — PROGRESS NOTES
Physical Therapy  Facility/Department: [facility] MED SURG  Daily Treatment Note  NAME: Hina Pollock  : 1960  MRN: [id number]    Date of Service: 10/4/2024    Discharge Recommendations:  Continue to assess pending progress, Outpatient PT, Home with assist PRN        Patient Diagnosis(es): The encounter diagnosis was S/P total knee arthroplasty, left.    Assessment  Assessment: Pt. performed reclined B LE exercises in all plane x15 AAROM on L LE and GS x5\". Reclined L LE heel prop x1'. Seated B LE exercises in all planes x15. Pt. ambulated [id number] with FWW and standing break-CGA. Pt. reported knee pain decreased as ambulating. Pt. completed 4 steps x2 with both hand rails-CGA. Transfers: CGA. Bed Mobility: SUP.  Activity Tolerance: Patient tolerated treatment well    Plan  Physical Therapy Plan  General Plan: 2 times a day 7 days a week  Specific Instructions for Next Treatment: Once daily on weekends  Current Treatment Recommendations: Strengthening;ROM;Balance training;Functional mobility training;Transfer training;Neuromuscular re-education;Stair training;Gait training;Home exercise program;Safety education & training;Patient/Caregiver education & training;Manual;Therapeutic activities;Endurance training    Restrictions  Restrictions/Precautions  Restrictions/Precautions: Fall Risk, General Precautions, Weight Bearing  Lower Extremity Weight Bearing Restrictions  Left Lower Extremity Weight Bearing: Weight Bearing As Tolerated     Subjective   Subjective  Subjective: Pt. in bed upon arrival, agreeable to therapy.  Pain: L knee 2/10  Orientation  Overall Orientation Status: Within Functional Limits  Cognition  Overall Cognitive Status: WFL    Objective  Bed Mobility Training  Bed Mobility Training: Yes  Overall Level of Assistance: Supervision  Interventions: Verbal cues  Rolling: Supervision  Supine to Sit: Supervision  Sit to Supine: Supervision  Scooting: Supervision  Transfer Training  Transfer Training:

## 2024-10-04 NOTE — PROGRESS NOTES
Progress Note    Subjective:     Post-Operative Day: 1 Status Post left Total Knee Arthroplasty    Patient resting comfortably in bed.  Pain well-controlled.  Ambulating well to restroom with assistance from nursing.  Does feel ready to go home today.  Feels she has some chest congestion this morning, but denies chest pain, shortness of breath, nausea, lightheadedness, dizziness, calf pain, or numbness and tingling in her toes and feet.    Objective:   VITALS:  /70   Pulse 86   Temp 97 °F (36.1 °C) (Temporal)   Resp 18   Ht 1.676 m (5' 6\")   Wt 84 kg (185 lb 3.2 oz)   SpO2 94%   BMI 29.89 kg/m²   24HR INTAKE/OUTPUT:    Intake/Output Summary (Last 24 hours) at 10/4/2024 0646  Last data filed at 10/4/2024 06  Gross per 24 hour   Intake 3350.32 ml   Output 1500 ml   Net 1850.32 ml     TEMPERATURE:  Current - Temp: 97 °F (36.1 °C); Max - Temp  Av.8 °F (36 °C)  Min: 96.6 °F (35.9 °C)  Max: 97.1 °F (36.2 °C)  RESPIRATIONS RANGE: Resp  Av.6  Min: 13  Max: 20  PULSE RANGE: Pulse  Av.3  Min: 77  Max: 99  BLOOD PRESSURE RANGE:  Systolic (24hrs), Av , Min:100 , Max:132  ; Diastolic (24hrs), Av, Min:49, Max:71   PULSE OXIMETRY RANGE: SpO2  Av.5 %  Min: 92 %  Max: 99 %       General: alert, appears stated age, cooperative, and no distress   Wound: Wound clean and dry no evidence of infection., No Erythema, and No Drainage   DVT Exam: No evidence of DVT seen on physical exam.  Negative Lopez's sign.  No cords or calf tenderness.  No significant calf/ankle edema.       Knee swollen but thigh soft to palpation and compressible.  Motor intact quad, hamstring, TA, GSC, EHL, FHL.  Sensation intact light touch SPN, DPN, sural, saphenous, tibial nerve distribution.  2+ DP pulse.  Toes warm and well-perfused  Data Review  CBC:   Lab Results   Component Value Date/Time    WBC 7.5 2024 02:24 PM    RBC 4.41 2024 02:24 PM    RBC 3.61 2024 11:52 AM    HGB 12.1 10/04/2024 06:16 AM

## 2024-10-07 ENCOUNTER — HOSPITAL ENCOUNTER (OUTPATIENT)
Dept: PHYSICAL THERAPY | Age: 64
Setting detail: THERAPIES SERIES
Discharge: HOME | End: 2024-10-07
Payer: COMMERCIAL

## 2024-10-07 PROCEDURE — 97161 PT EVAL LOW COMPLEX 20 MIN: CPT

## 2024-10-07 PROCEDURE — 97110 THERAPEUTIC EXERCISES: CPT

## 2024-10-07 NOTE — PROGRESS NOTES
Phone: [phone]                       [facility]          Fax: [phone]                      Outpatient Physical Therapy                                                                      Evaluation  Date: 10/7/2024  Patient: Hina Pollock  : 1960  CSN #: [id number]    Referring Physician: Shiv Tuttle MD    Medical Diagnosis: L knee OA, M17.12    Treatment Diagnosis: L knee pain  Onset Date: 10/03/24  PT Insurance Information: [employer]  Total # of Visits Approved: 12   Total # of Visits to Date: 1  No Show: 0  Canceled Appointment: 0    [x] This writer acknowledges review of patient history form     Subjective  Subjective: Patient reports compliance with ice, elevation, compression socks, home exercises and use of walker. Average pain is about 2-3/10 at rest. Pt has not attempted stairs yet at home. Pt would like to be able to walk outdoors long distance without AD and without pain.  Additional Pertinent Hx: HTN, pre diabetes, , hx of R SONAM     Observations:   General Observations  Description: Pt amb with decreased WB'ing L LE, lack of L TKE, moderate L knee edema, post op dressing intact with moderate sanguinous drainage. Edges of bandage are irritating the skin and there is a red, open area on medial side of bandage with purulent drainage. Open area is 8cm long by 1.5cm wide. Redness extends [id number].    Objective    AROM       AROM LLE (degrees)  L Knee Flexion (0-145): 14-79*         Strength       Strength LLE  L Hip Flexion: 3-/5  L Hip ABduction: 3-/5  L Hip ADduction: 3/5  L Knee Flexion: 3-/5  L Knee Extension: 3-/5  L Ankle Dorsiflexion: 4-/5         Exercises:  Exercise 1: HEP: heel prop, quad sets, heel slides, LAQ, seated heel slides    Functional Outcome Measures  Any of your usual work, housework, or school activities: Extreme Difficulty or Unable to Perform Activity  Your usual hobbies, recreational, or sporting activities: Extreme Difficulty or Unable to Perform

## 2024-10-07 NOTE — PLAN OF CARE
[facility]           Phone: [phone]             Outpatient Physical Therapy  Fax: [phone]                                           Date: 10/7/2024  Patient: Hina Pollock : 1960 [facility] #: [id number]   Referring Physician: Shiv Tuttle MD      [x] Plan of Care   [] Updated Plan of Care    Dates of Service to Include: 10/7/2024 to 24    Diagnosis:  L knee OA, M17.12     Rehab (Treatment) Diagnosis:  L knee pain         Onset Date:  10/03/24    Attendance  Total # of Visits to Date: 1 No Show: 0 Canceled Appointment: 0    Assessment  Assessment: Patient is 64 year old female s/p L TKA who presents with increased pain 3/10 at rest that is worse with ROM/stretching.Pt amb with decreased WB'ing L LE, lack of L TKE, moderate L knee edema, post op dressing intact with moderate sanguinous drainage. Edges of bandage are irritating the skin and there is a red, open area on medial side of bandage with purulent drainage. Open area is 8cm long by 1.5cm wide. Redness extends [id number]. L knee ROM 14-79* before stretching and 8-82* after stretching. L LE strength is 3-/5 grossly. Instructed patient to call doctor ASAP regarding open area/signs of infection and patient gives assent/understanding. Patient to benefit from physical therapy to decrease pain and edema, improve L knee ROM and strength and normalize gait to return to PLOF.      Goals  Short Term Goals  Time Frame for Short Term Goals: 3 weeks  Short Term Goal 1: Patient to initiate HEP for improved L knee ROM and strength.  Short Term Goal 2: Patient to have improved L knee ROM 5-90* for improved mobility.  Short Term Goal 3: Patient to tolerate manual techniques/modalities to decrease L knee pain and edema and improve mobility.  Long Term Goals  Time Frame for Long Term Goals : 6 weeks  Long Term Goal 1: Patient to be independent and compliant with HEP.  Long  BSE complete

## 2024-10-09 ENCOUNTER — HOSPITAL ENCOUNTER (OUTPATIENT)
Dept: PHYSICAL THERAPY | Age: 64
Setting detail: THERAPIES SERIES
Discharge: HOME | End: 2024-10-09
Payer: COMMERCIAL

## 2024-10-09 PROCEDURE — 97110 THERAPEUTIC EXERCISES: CPT

## 2024-10-09 NOTE — PROGRESS NOTES
Phone: [phone]                 [facility]           Fax: [phone]                           Outpatient Physical Therapy                                                                            Daily Note    Patient: Hina Pollock : 1960  [facility] #: [id number]   Referring Physician: Shiv Tuttle MD  Date: 10/9/2024    Diagnosis: L knee OA, M17.12  Treatment Diagnosis: L knee pain    Onset Date: 10/03/24  PT Insurance Information: A[employer]na  Total # of Visits Approved: 12 Per Physician Order  Total # of Visits to Date: 2  No Show: 0  Canceled Appointment: 0    24 Plan of Care/Recert Due    Pre-Treatment Pain:  2/10  Subjective: Pt reports 2/10 L knee pain today, stating she did take her pain medication earlier this morning. Pt states she pivoted on her knee yesterday and the pain increased, but was better as the day went.    Exercises:  Exercise 1: HEP: heel prop, quad sets, heel slides, LAQ, seated heel slides  Exercise 2: Scifit x5 min  Exercise 3: Step stretches flex/ext 3x30\", slantboard stretch 3x30\"  Exercise 4: Seated LAQ, march x10 ea  Exercise 5: Supine SLR, heel slides, SAQ x10 ea    Modality:     Modality Flow Sheet:   Performed (X) Tx Modality   x Cold Pack: x10 min L knee to decrease soreness and pain     Assessment  Assessment: Initiated ther ex for improved mobility and strength, fair tolerance. Pt required AAROM with seated and supine ex this visit. L knee flex 85* post exercises. Pt continues with moderate L knee edema and red open area on medial knee with purulent drainage. No change in size this visit of wound, small blister noted at top of red area. Pt states she is seeing surgeon this PM for wound. CP post tx for pain control and soreness. Continue as pt tolerates.    Activity Tolerance  Activity Tolerance: Patient tolerated treatment well    Patient Education  Patient Education: HEP  Pt verbalized/demonstrated good understanding:     [x] Yes         [] No, pt required

## 2024-10-11 ENCOUNTER — HOSPITAL ENCOUNTER (OUTPATIENT)
Dept: PHYSICAL THERAPY | Age: 64
Setting detail: THERAPIES SERIES
Discharge: HOME | End: 2024-10-11
Payer: COMMERCIAL

## 2024-10-11 PROCEDURE — G0283 ELEC STIM OTHER THAN WOUND: HCPCS

## 2024-10-11 PROCEDURE — 97110 THERAPEUTIC EXERCISES: CPT

## 2024-10-11 NOTE — PROGRESS NOTES
Phone: [phone]                 [facility]           Fax: [phone]                           Outpatient Physical Therapy                                                                            Daily Note    Patient: Hina Pollock : 1960  CSN #: [id number]   Referring Physician: Shiv Tuttle MD  Date: 10/11/2024    Diagnosis: L knee OA, M17.12  Treatment Diagnosis: L knee pain    Onset Date: 10/03/24  PT Insurance Information: Aetna  Total # of Visits Approved: 12 Per Physician Order  Total # of Visits to Date: 3  No Show: 0  Canceled Appointment: 0    24 Plan of Care/Recert Due    Pre-Treatment Pain:  2/10  Subjective: Patient reports 2/10 L knee pain at rest, worse with movement. The doctor trimmed her bandage away to decrease skin irritation, they did not think it was infected but gave her a preventative antibiotic topical to apply 2x/day.    Exercises:  Exercise 1: HEP: heel prop, quad sets, heel slides, LAQ, seated heel slides  Exercise 2: Scifit x8 min  Exercise 3: Step stretches flex/ext 3x30\", slantboard stretch 3x30\"  Exercise 4: Seated LAQ, march x10 ea  Exercise 5: Supine SLR, heel slides, SAQ x10 ea  Exercise 6: Seated L HS stretch [id number]  Exercise 7: Sit/stands x10, B hha, L foot back, R foot forward  Exercise 8: Standing L TKE with red ball against wall [id number] holds      Modality: IFC and CP to L knee in elevation x15 min to decrease pain/edema    Assessment  Assessment: Progressed to closed chain quad strengthening today with fair tolerance from patient. Pt achieved 10-85* ROM L knee after stretching today. Educated patient to focus on improving L knee extension at home. Noted decreased redness around wound today, moderate purulent drainage continues, decreased warmth. Current wound size is 8cmx2.5cm, with raised blister at distal end as well. Initiated IFC and CP to L knee in elevation to decrease pain and swelling, avoiding L medial/distal knee for electrode

## 2024-10-15 ENCOUNTER — HOSPITAL ENCOUNTER (OUTPATIENT)
Dept: PHYSICAL THERAPY | Age: 64
Setting detail: THERAPIES SERIES
Discharge: HOME | End: 2024-10-15
Payer: COMMERCIAL

## 2024-10-15 PROCEDURE — 97140 MANUAL THERAPY 1/> REGIONS: CPT

## 2024-10-15 PROCEDURE — 97110 THERAPEUTIC EXERCISES: CPT

## 2024-10-15 NOTE — PROGRESS NOTES
Phone: [phone]                 [facility]           Fax: [phone]                           Outpatient Physical Therapy                                                                            Daily Note    Patient: Hina Pollock : 1960  CSN #: [id number]   Referring Physician: Shiv Tuttle MD  Date: 10/15/2024    Diagnosis: L knee OA, M17.12  Treatment Diagnosis: L knee pain    Onset Date: 10/03/24  PT Insurance Information: A[employer]  Total # of Visits Approved: 12 Per Physician Order  Total # of Visits to Date: 4  No Show: 0  Canceled Appointment: 0    24 Plan of Care/Recert Due    Pre-Treatment Pain:  3/10  Subjective: Pt with 3/10 pain with activity. States she see's progress daily    Exercises:  Exercise 1: HEP: heel prop, quad sets, heel slides, LAQ, seated heel slides  Exercise 2: Scifit x10 min 3.0  Exercise 3: Step stretches flex/ext 3x30\", slantboard stretch 3x30\"  Exercise 5: Supine SLR, heel slides, SAQ x10 ea  Exercise 6: Seated L HS stretch [id number]  Exercise 7: Sit/stands x10, B hha, L foot back, R foot forward  Exercise 8: Standing L TKE with red ball against wall [id number] holds  Exercise 9: FSU--6\" 10x -15x    Manual:  Other: Passive stretching for flexion/Extension  in supine position    Assessment  Assessment: Pain 3/10 with activity. Wound healing up nicely with minimal drainage. Progressed pt to st cane  for gait with no balance issues noted. Educated pt in prone hangs for extension range. Flexion remains 85-88 deg passive.    Activity Tolerance  Activity Tolerance: Patient tolerated treatment well    Patient Education  Patient Education: Progression of home exercise  Pt verbalized/demonstrated good understanding:     [x] Yes         [] No, pt required further clarification.       Post Treatment Pain:  3/10      Plan  Plan Frequency: 3  Plan weeks: 4       Goals  (Total # of Visits to Date: 4)      Short Term Goals  Short Term Goal 1: Patient to initiate HEP for

## 2024-10-16 ENCOUNTER — HOSPITAL ENCOUNTER (OUTPATIENT)
Dept: PHYSICAL THERAPY | Age: 64
Setting detail: THERAPIES SERIES
Discharge: HOME | End: 2024-10-16
Payer: COMMERCIAL

## 2024-10-16 PROCEDURE — 97140 MANUAL THERAPY 1/> REGIONS: CPT

## 2024-10-16 PROCEDURE — 97110 THERAPEUTIC EXERCISES: CPT

## 2024-10-16 NOTE — PROGRESS NOTES
Phone: [phone]                 [facility]           Fax: [phone]                           Outpatient Physical Therapy                                                                            Daily Note    Patient: Hina Pollock : 1960  [facility] #: [id number]   Referring Physician: Shiv Tuttle MD  Date: 10/16/2024    Diagnosis: L knee OA, M17.12  Treatment Diagnosis: L knee pain    Onset Date: 10/03/24  PT Insurance Information: Aetna  Total # of Visits Approved: 12 Per Physician Order  Total # of Visits to Date: 5  No Show: 0    24 Plan of Care/Recert Due    Pre-Treatment Pain:  2/10  Subjective: Pt reports /10 L knee soreness/achy. Pt states she is trying to bend her knee more.    Exercises:  Exercise 1: HEP: heel prop, quad sets, heel slides, LAQ, seated heel slides  Exercise 2: Scifit x10 min 3.0  Exercise 3: Step stretches flex/ext 3x30\", slantboard stretch 3x30\"  Exercise 5: Supine SLR, heel slides, SAQ x10 ea  Exercise 6: Seated L HS stretch [id number]  Exercise 7: Sit/stands x10, B hha, L foot back, R foot forward  Exercise 8: Standing L TKE with red ball against wall [id number] holds  Exercise 9: FSU--6\" 10x -15x  Exercise 10: Supine L knee hang x2 min with 2# weight    Manual:  Other: Passive stretching for flexion/Extension  in supine position    Modality:     Modality Flow Sheet:   Performed (X) Tx Modality   x Cold Pack: x10 min L knee for soreness     Assessment  Assessment: Pt with noted extension lag with SLR. Pt required frequent VCs for knee extension throughout tx. PROM 93* in supine. CP post tx for soreness. Continue as pt tolerates.    Activity Tolerance  Activity Tolerance: Patient tolerated treatment well    Patient Education  Patient Education: Progression of home exercise  Pt verbalized/demonstrated good understanding:     [x] Yes         [] No, pt required further clarification.       Post Treatment Pain:  3/10      Plan  Plan Frequency: 3  Plan weeks:

## 2024-10-18 ENCOUNTER — HOSPITAL ENCOUNTER (OUTPATIENT)
Dept: PHYSICAL THERAPY | Age: 64
Setting detail: THERAPIES SERIES
Discharge: HOME | End: 2024-10-18
Payer: COMMERCIAL

## 2024-10-18 PROCEDURE — 97110 THERAPEUTIC EXERCISES: CPT

## 2024-10-18 PROCEDURE — 97140 MANUAL THERAPY 1/> REGIONS: CPT

## 2024-10-18 NOTE — PROGRESS NOTES
Phone: [phone]                 [facility]           Fax: [phone]                           Outpatient Physical Therapy                                                                            Daily Note    Patient: Hina Pollock : 1960  CSN #: [id number]   Referring Physician: Shiv Tuttle MD  Date: 10/18/2024    Diagnosis: L knee OA, M17.12  Treatment Diagnosis: L knee pain    Onset Date: 10/03/24  PT Insurance Information: Aetna  Total # of Visits Approved: 12 Per Physician Order  Total # of Visits to Date: 6  No Show: 0  Canceled Appointment: 0    24 Plan of Care/Recert Due    Pre-Treatment Pain:  1-2/10  Subjective: Pt reports 1-2/10 L knee pain today. Pt states she feels that it is bending more but still having difficulty with getting her L leg straight.    Exercises:  Exercise 1: HEP: heel prop, quad sets, heel slides, LAQ, seated heel slides  Exercise 2: Scifit x10 min 3.5  Exercise 3: Step stretches flex/ext 3x30\", slantboard stretch 3x30\"  Exercise 4: Seated LAQ with 5\" , march x10 ea  Exercise 7: Sit/stands x10, B hha, L foot back, R foot forward  Exercise 8: TKE with BTB x15  Exercise 9: FSU--6\" 15x, LSU 6\" x10    Manual:  Soft Tissue Mobilizaton: IASTM to posterior knee in prone x8 min to decrease muscle tightness.    Modality:     Modality Flow Sheet:   Performed (X) Tx Modality   x Cold Pack: x10 min L knee for soreness     Assessment  Assessment: Added manual IASTM to posterior knee in prone to decrease muscle tightness, good tolerance. L knee ext in supine post IASTM 7*. CP post tx for soreness. Continue as pt tolerates.    Activity Tolerance  Activity Tolerance: Patient tolerated treatment well    Patient Education  Patient Education: HEP  Pt verbalized/demonstrated good understanding:     [x] Yes         [] No, pt required further clarification.       Post Treatment Pain:  3/10      Plan  Plan Frequency: 3  Plan weeks: 4       Goals  (Total # of Visits to

## 2024-10-22 ENCOUNTER — HOSPITAL ENCOUNTER (OUTPATIENT)
Dept: PHYSICAL THERAPY | Age: 64
Setting detail: THERAPIES SERIES
Discharge: HOME | End: 2024-10-22
Payer: COMMERCIAL

## 2024-10-22 PROCEDURE — 97110 THERAPEUTIC EXERCISES: CPT

## 2024-10-22 PROCEDURE — 97140 MANUAL THERAPY 1/> REGIONS: CPT

## 2024-10-22 NOTE — PROGRESS NOTES
Phone: [phone]                 [facility]           Fax: [phone]                           Outpatient Physical Therapy                                                                            Daily Note    Patient: Hina Pollock : 1960  [facility] #: [id number]   Referring Physician: Shiv Tuttle MD  Date: 10/22/2024       Treatment Diagnosis: L knee pain    Onset Date: 10/03/24  PT Insurance Information: Aetna  Total # of Visits Approved: 12 Per Physician Order  Total # of Visits to Date: 7  No Show: 0  Canceled Appointment: 0    24 Plan of Care/Recert Due    Pre-Treatment Pain:  2/10  Subjective: Pt states her knee isnt to bad today.  Pt states her Dr appt went well, dr is happy with progressions so far.  Pt rates current pain a 2/10.    Exercises:  Exercise 2: Scifit x10 min 3.5  Exercise 3: Step stretches flex/ext 3x30\", slantboard stretch 3x30\"  Exercise 7: Sit/stands x10, B hha, L foot back, R foot forward  Exercise 8: TKE with BTB x15  Exercise 9: FSU--6\" 15x, LSU 6\" x10  Exercise 10: Supine L knee hang x2 min with 2# weight    Manual:  Other: Passive stretching for flexion/Extension  in supine position    Modality:   Modality Flow Sheet:   Performed (X) Tx Modality    Electrical Stim:      Ultrasound: ___ W/cm2 x ___ mins  Duty factor: __100%  __50%  __20% __10%  Head size: 10 mm      ______ Other:   MHz: __1mHz __2 mHz  __3mHz  Location:                                          Hot Pack:   x Cold Pack: 15 mins for discomfort.      Assessment  Assessment: L knee flexion 98* today with slight overpressure.  Pt encouraged to continue extension stretching to better heel strike during amb.    Activity Tolerance  Activity Tolerance: Patient tolerated treatment well    Patient Education  Patient Education: HEP  Pt verbalized/demonstrated good understanding:     [x] Yes         [] No, pt required further clarification.     Post Treatment Pain:  1/10    Plan  Plan Frequency: 3  Plan

## 2024-10-23 ENCOUNTER — HOSPITAL ENCOUNTER (OUTPATIENT)
Dept: PHYSICAL THERAPY | Age: 64
Setting detail: THERAPIES SERIES
Discharge: HOME | End: 2024-10-23
Payer: COMMERCIAL

## 2024-10-23 PROCEDURE — 97110 THERAPEUTIC EXERCISES: CPT

## 2024-10-23 PROCEDURE — 97140 MANUAL THERAPY 1/> REGIONS: CPT

## 2024-10-23 NOTE — PROGRESS NOTES
Phone: [phone]                 [facility]           Fax: [phone]                           Outpatient Physical Therapy                                                                            Daily Note    Patient: Hina Pollock : 1960  [facility] #: [id number]   Referring Physician: Shiv Tuttle MD  Date: 10/23/2024    Diagnosis: L knee OA, M17.12  Treatment Diagnosis: L knee pain    Onset Date: 10/03/24  PT Insurance Information: Aetna  Total # of Visits Approved: 12 Per Physician Order  Total # of Visits to Date: 8  No Show: 0  Canceled Appointment: 0    24 Plan of Care/Recert Due    Pre-Treatment Pain:  2/10  Subjective: Pt states she is doing good today, 2/10 L knee pain.    Exercises:  Exercise 1: HEP: heel prop, quad sets, heel slides, LAQ, seated heel slides  Exercise 2: Scifit x10 min 3.5  Exercise 3: Step stretches flex/ext 3x30\", slantboard stretch 3x30\"  Exercise 7: Sit/stands x10, B hha, L foot back, R foot forward  Exercise 8: TKE with BTB x15  Exercise 9: FSU--6\" 15x, LSU 6\" x10  Exercise 11: Standing sink ex x10 ea    Manual:  Soft Tissue Mobilizaton: IASTM to posterior knee in prone x8 min to decrease muscle tightness.  Other: Passive stretching for flexion/Extension  in supine position    Modality:     Modality Flow Sheet:   Performed (X) Tx Modality   x Cold Pack: x10 min L knee for soreness     Assessment  Assessment: Progressed ther ex with standing sink ex, good tolerance. pt required frequent VCs for posture and technique with standing ex this visit. Continued with manual techniques to decrease tone in posterior L knee. CP post tx for soreness. Continue as pt tolerates.    Activity Tolerance  Activity Tolerance: Patient tolerated treatment well    Patient Education  Patient Education: HEP  Pt verbalized/demonstrated good understanding:     [x] Yes         [] No, pt required further clarification.       Post Treatment Pain:  2/10      Plan  Plan Frequency: 3  Plan

## 2024-10-25 ENCOUNTER — HOSPITAL ENCOUNTER (OUTPATIENT)
Dept: PHYSICAL THERAPY | Age: 64
Setting detail: THERAPIES SERIES
Discharge: HOME | End: 2024-10-25
Payer: COMMERCIAL

## 2024-10-25 PROCEDURE — 97140 MANUAL THERAPY 1/> REGIONS: CPT

## 2024-10-25 PROCEDURE — 97110 THERAPEUTIC EXERCISES: CPT

## 2024-10-25 NOTE — PROGRESS NOTES
Phone: [phone]                 [facility]           Fax: [phone]                           Outpatient Physical Therapy                                                                            Daily Note    Patient: Hina Pollock : 1960  [facility] #: [id number]   Referring Physician: Shiv Tuttle MD  Date: 10/25/2024    Diagnosis: L knee OA, M17.12  Treatment Diagnosis: L knee pain    Onset Date: 10/03/24  PT Insurance Information: Aetna  Total # of Visits Approved: 12 Per Physician Order  Total # of Visits to Date: 9  No Show: 0  Canceled Appointment: 0    24 Plan of Care/Recert Due    Pre-Treatment Pain:  1-2/10  Subjective: Pt reports 1-2/10 L knee pain today. Pt states she was able to go up the steps at her work yesterday and walked 2 blocks, no increase in pain.    Exercises:  Exercise 1: HEP: heel prop, quad sets, heel slides, LAQ, seated heel slides  Exercise 2: Scifit x10 min 3.5  Exercise 3: Step stretches flex/ext 3x30\", slantboard stretch 3x30\"  Exercise 7: Sit/stands x10, B hha, L foot back, R foot forward  Exercise 8: TKE with BTB x15  Exercise 9: FSU--6\" 15x, LSU 6\" x10  Exercise 11: Standing sink ex x10 ea    Manual:  Other: Passive stretching for flexion/Extension  in supine position    Modality:     Modality Flow Sheet:   Performed (X) Tx Modality   x Cold Pack: x10m in L knee fore soreness     Assessment  Assessment: Pt continues to demo decreased L TKE with ambulation and standing. PROM to L knee per tolerance for improved ROM. AROM L knee -9* - 95* in seated, 105* with slight overpressure. CP post tx for soreness. Continue as pt tolerates.    Activity Tolerance  Activity Tolerance: Patient tolerated treatment well    Patient Education  Patient Education: HEP  Pt verbalized/demonstrated good understanding:     [x] Yes         [] No, pt required further clarification.       Post Treatment Pain:  1-2/10      Plan  Plan Frequency: 3  Plan weeks: 4       Goals  (Total # of

## 2024-10-29 ENCOUNTER — HOSPITAL ENCOUNTER (OUTPATIENT)
Dept: PHYSICAL THERAPY | Age: 64
Setting detail: THERAPIES SERIES
Discharge: HOME | End: 2024-10-29
Payer: COMMERCIAL

## 2024-10-29 PROCEDURE — 97110 THERAPEUTIC EXERCISES: CPT

## 2024-10-29 PROCEDURE — 97140 MANUAL THERAPY 1/> REGIONS: CPT

## 2024-10-29 NOTE — PROGRESS NOTES
Phone: [phone]                 [facility]           Fax: [phone]                           Outpatient Physical Therapy                                                                            Daily Note    Patient: Hina Pollock : 1960  [facility] #: [id number]   Referring Physician: Shiv Tuttle MD  Date: 10/29/2024     Treatment Diagnosis: L knee pain    Onset Date: 10/03/24  PT Insurance Information: Aetna  Total # of Visits Approved: 12 Per Physician Order  Total # of Visits to Date: 10  No Show: 0  Canceled Appointment: 0    24 Plan of Care/Recert Due    Pre-Treatment Pain:  2/10  Subjective: Pt states her pain today is a 1-2/10.  Pt reports she occasionally forgets her cane but its gettig a little better.    Exercises:  Exercise 2: Scifit x10 min 3.5  Exercise 3: Step stretches flex/ext 3x30\", slantboard stretch 3x30\"  Exercise 6: Seated L HS stretch [id number]  Exercise 7: Sit/stands x10, B hha, L foot back, R foot forward  Exercise 8: TKE with BTB x15  Exercise 9: FSU--6\" 15x, LSU 6\" x10  Exercise 11: Standing sink ex x10 ea    Manual:  Other: Passive stretching for flexion/Extension  in supine position    Modality:   Modality Flow Sheet:   Performed (X) Tx Modality    Electrical Stim:      Ultrasound: ___ W/cm2 x ___ mins  Duty factor: __100%  __50%  __20% __10%  Head size: 10 mm      ______ Other:   MHz: __1mHz __2 mHz  __3mHz  Location:                                          Hot Pack:   x Cold Pack: 10 mins for discomfort.      Assessment  Assessment: Pt continues to display slightly flexed knee during amb cycle.  Focused on ROM today with fair tolerance noted.   Will continue.    Activity Tolerance  Activity Tolerance: Patient tolerated treatment well    Patient Education  Patient Education: HEP  Pt verbalized/demonstrated good understanding:     [x] Yes         [] No, pt required further clarification.     Post Treatment Pain:  1/10    Plan  Plan Frequency: 3  Plan weeks:

## 2024-10-30 ENCOUNTER — HOSPITAL ENCOUNTER (OUTPATIENT)
Dept: PHYSICAL THERAPY | Age: 64
Setting detail: THERAPIES SERIES
Discharge: HOME | End: 2024-10-30
Payer: COMMERCIAL

## 2024-10-30 PROCEDURE — 97110 THERAPEUTIC EXERCISES: CPT

## 2024-10-30 PROCEDURE — 97140 MANUAL THERAPY 1/> REGIONS: CPT

## 2024-10-30 NOTE — PROGRESS NOTES
Phone: [phone]                 [facility]           Fax: [phone]                           Outpatient Physical Therapy                                                                            Daily Note    Patient: Hina Pollcok : 1960  CSN #: [id number]   Referring Physician: Shiv Tuttle MD  Date: 10/30/2024    Diagnosis: L knee OA, M17.12  Treatment Diagnosis: L knee pain    Onset Date: 10/03/24  PT Insurance Information: Aetna  Total # of Visits Approved: 12 Per Physician Order  Total # of Visits to Date: 11  No Show: 0    24 Plan of Care/Recert Due    Pre-Treatment Pain:  0-1/10  Subjective: Pt reports 0-1/10 pain today. Pt states she is doing good.    Exercises:  Exercise 1: HEP: heel prop, quad sets, heel slides, LAQ, seated heel slides  Exercise 2: Scifit x10 min 3.5  Exercise 3: Step stretches flex/ext 3x30\", slantboard stretch 3x30\"  Exercise 4: StarTrac flex 3 pl x10, ext x10  Exercise 5: [place] retro walk 10# x10  Exercise 7: Sit/stands x10, B hha, L foot back, R foot forward  Exercise 8: TKE with BTB x15  Exercise 9: FSU--6\" 15x, LSU 6\" x15    Manual:  Other: Passive stretching for flexion/Extension  in supine position    Modality:     Modality Flow Sheet:   Performed (X) Tx Modality   x Cold Pack: x10 min L knee for soreness     Assessment  Assessment: Progressed with resisted retro ambulation this visit, good tolerance. L knee strength grossly 4/5. L knee flex 97* seated, 103* with overpressure. CP post tx for soreness. Continue as pt tolerates.    Activity Tolerance  Activity Tolerance: Patient tolerated treatment well    Patient Education  Patient Education: HEP  Pt verbalized/demonstrated good understanding:     [x] Yes         [] No, pt required further clarification.       Post Treatment Pain:  1-2/10      Plan  Plan Frequency: 3  Plan weeks: 4       Goals  (Total # of Visits to Date: 11)      Short Term Goals  Time Frame for Short Term Goals: 3 weeks  Short

## 2024-11-01 ENCOUNTER — HOSPITAL ENCOUNTER (OUTPATIENT)
Dept: PHYSICAL THERAPY | Age: 64
Setting detail: THERAPIES SERIES
Discharge: HOME | End: 2024-11-01
Payer: COMMERCIAL

## 2024-11-01 PROCEDURE — 97110 THERAPEUTIC EXERCISES: CPT

## 2024-11-01 PROCEDURE — 97140 MANUAL THERAPY 1/> REGIONS: CPT

## 2024-11-01 NOTE — PROGRESS NOTES
Physical Therapy  Phone: [phone]                 [facility]           Fax: [phone]                           Outpatient Physical Therapy                                                                            Daily Note    Patient: Hina Pollock : 1960  [facility] #: [id number]   Referring Physician: Shiv Tuttle MD  Date: 2024     Treatment Diagnosis: L knee pain    Onset Date: 10/03/24  PT Insurance Information: Aetna  Total # of Visits Approved: 12 Per Physician Order  Total # of Visits to Date: 12  No Show: 0  Canceled Appointment: 0    24 Plan of Care/Recert Due    Pre-Treatment Pain:  0/10  Subjective: Pt denied pain at start of treatment. Pt reports she has been ambulating in the house using furnature surfing and SP cane in community. Pt notes she has been taking oxycodone at night to help with sleep disturbances from pain. Pt states she has been completing daily HEP. Pt reports increased difficulty/pain with knee ext and flexion motion.    Exercises:  Exercise 2: Scifit x 10 min 4.0  Exercise 3: Step stretches flex/ext 3x30\", slantboard stretch 3x30\"  Exercise 8: TKE with BTB x20  Exercise 9: FSU--6\" 15x, LSU 6\" x15  Exercise 10: Supine L knee hang x2 min with 2# weight, supine quad set x15  Exercise 12: Prone TKE 5 sec holdx15    Manual:  Other: Passive stretching for flexion/Extension  in supine position, HS stretch, prone quad stretch    Modality:   Modality Flow Sheet:   Performed (X) Tx Modality   X Cold Pack: supine 10 min anterior and posterior knee joint L      Assessment  Assessment: VCs provided with Step ups for full TKE and quad activation. Vcs provided with TKE to avoid excessive glute and trunk compensations. Pt unable to clear table with prone TKE d/t lack of quad strength and knee ext ROM, therapist providing TCs for prevention of glute compensation. Trialed prone quad stretch for improving knee flexion ROM, cues given for breathing technqiues. Pt AROM knee

## 2024-11-05 ENCOUNTER — HOSPITAL ENCOUNTER (OUTPATIENT)
Dept: PHYSICAL THERAPY | Age: 64
Setting detail: THERAPIES SERIES
Discharge: HOME | End: 2024-11-05
Payer: COMMERCIAL

## 2024-11-05 PROCEDURE — 97110 THERAPEUTIC EXERCISES: CPT

## 2024-11-05 PROCEDURE — 97140 MANUAL THERAPY 1/> REGIONS: CPT

## 2024-11-05 NOTE — PROGRESS NOTES
Phone: [phone]                 [facility]           Fax: [phone]                           Outpatient Physical Therapy                                                                            Daily Note    Patient: Hina Pollock : 1960  [facility] #: [id number]   Referring Physician: Shiv Tuttle MD  Date: 2024       Treatment Diagnosis: L knee pain    Onset Date: 10/03/24  PT Insurance Information: Aetna  Total # of Visits Approved: 12 Per Physician Order  Total # of Visits to Date: 13  No Show: 0  Canceled Appointment: 0    24 Plan of Care/Recert Due    Pre-Treatment Pain: Pt. Stated stiffness this date rather then pain   Subjective: Pt. denies pain prior to treatment, reports some stiffness. Pt. reports she is doing good.    Exercises:  Exercise 1: HEP: heel prop, quad sets, heel slides, LAQ, seated heel slides  Exercise 2: Scifit x 10 min 4.0  Exercise 3: Step stretches flex/ext 3x30\", slantboard stretch 3x30\"  Exercise 8: TKE with BTB x20  Exercise 9: FSU--6\" 15x, LSU 6\" x15  Exercise 10: Supine L knee hang x2 min with 2# weight, supine quad set x20    Manual:  Other: Passive stretching for flexion/Extension  in supine position      Modality Flow Sheet:   Performed (X) Tx Modality    Cold Pack: x10 min L knee for soreness in supine          Assessment  Assessment: Continued with therex for improvement of ROM this date with mild limitation d/t pain. Pt's AAROM L knee flexion 100*, L knee extension lacking 5* from neutral. CP for post treatment for decrease in soreness. Pt. noted with \"achy\" pain post treatment. Education given to continue stretches for an increase in ROM. Will continue to progress as tolerated.    Activity Tolerance  Activity Tolerance: Patient tolerated treatment well, Patient limited by pain    Patient Education  Patient Education: Educated on completing prone knee flexion for quad stretch at home.  Pt verbalized/demonstrated good understanding:     [x] Yes

## 2024-11-06 ENCOUNTER — HOSPITAL ENCOUNTER (OUTPATIENT)
Dept: PHYSICAL THERAPY | Age: 64
Setting detail: THERAPIES SERIES
Discharge: HOME | End: 2024-11-06
Payer: COMMERCIAL

## 2024-11-06 PROCEDURE — 97110 THERAPEUTIC EXERCISES: CPT

## 2024-11-06 NOTE — PLAN OF CARE
[facility]           Phone: [phone]             Outpatient Physical Therapy  Fax: [phone]                                           Date: 2024  Patient: Hina Pollock : 1960 CSN #: [id number]   Referring Physician: Shiv Tuttle MD      [] Plan of Care   [x] Updated Plan of Care    Dates of Service to Include: 2024 to 24    Diagnosis:  L knee OA, M17.12     Rehab (Treatment) Diagnosis:  L knee pain         Onset Date:  10/03/24    Attendance  Total # of Visits to Date: 14 No Show: 0 Canceled Appointment: 0    Assessment  Assessment: Patient has attended 14 PT visits s/p L TKA and has met goals for initiating HEP and ROM/strengthening. Current L knee ROM is 5-100* after stretching. Patient continues to demo lack of L TKE during gait and use of SPC for stability and would benefit from continued PT for up to 4 more weeks to meet remaining goals and return to PLOF.      Goals  Short Term Goals  Time Frame for Short Term Goals: 3 weeks  Short Term Goal 1: Patient to initiate HEP for improved L knee ROM and strength.-met  Short Term Goal 2: Patient to have improved L knee ROM 5-90* for improved mobility.-progressing (6-103* on )  Short Term Goal 3: Patient to tolerate manual techniques/modalities to decrease L knee pain and edema and improve mobility.-met  Long Term Goals  Time Frame for Long Term Goals : 6 weeks  Long Term Goal 1: Patient to be independent and compliant with HEP.-progressing  Long Term Goal 2: Patient to have improved L knee ROM 0-120* for improved mobility.-progressing  Long Term Goal 3: Patient to have improved L LE strength >/=4+/5 grossly for improved stability.-progressing  Long Term Goal 4: Patient to be able to walk outdoors with no AD, no gait deviations and no increase in knee pain to return to PLOF.-progressing     Prognosis  Therapy Prognosis: Good    Treatment

## 2024-11-06 NOTE — PROGRESS NOTES
Phone: [phone]                 [facility]           Fax: [phone]                           Outpatient Physical Therapy                                                                            Daily Note    Patient: Hina Pollock : 1960  [facility] #: [id number]   Referring Physician: Shiv Tuttle MD  Date: 2024    Diagnosis: L knee OA, M17.12  Treatment Diagnosis: L knee pain    Onset Date: 10/03/24  PT Insurance Information: Aetna  Total # of Visits Approved: 24 Per Physician Order  Total # of Visits to Date: 14  No Show: 0  Canceled Appointment: 0    24 Plan of Care/Recert Due    Pre-Treatment Pain:  0/10  Subjective: Patient denies pain at rest.    Exercises:  Exercise 1: HEP: heel prop, quad sets, heel slides, LAQ, seated heel slides  Exercise 2: Scifit x 10 min 4.0  Exercise 3: Step stretches flex/ext 3x30\", slantboard stretch 3x30\"  Exercise 4: StarTrac flex 3 pl x10, ext 3pl, DL x15  Exercise 5: [place] retro walk 10# x10  Exercise 7: Sit/stands x10, holding 5# weight  Exercise 9: FSU--6\" 15x, LSU 6\" x15, step downs x10, 4in      Modality: Cold pack x10 min to L knee to decrease pain/edema      Assessment  Assessment: Patient has attended 14 PT visits s/p L TKA and has met goals for initiating HEP and ROM/strengthening. Current L knee ROM is 5-100* after stretching. Patient continues to demo lack of L TKE during gait and use of SPC for stability and would benefit from continued PT for up to 4 more weeks to meet remaining goals and return to PLOF.    Activity Tolerance  Activity Tolerance: Patient tolerated treatment well, Patient limited by pain    Patient Education  Exercise technique, UPOC   Pt verbalized/demonstrated good understanding:     [x] Yes         [] No, pt required further clarification.       Post Treatment Pain:  2/10      Plan  Plan Frequency: 3  Plan weeks: 4       Goals  (Total # of Visits to Date: 14)      Short Term Goals  Time Frame for Short Term Goals: 3

## 2024-11-08 ENCOUNTER — HOSPITAL ENCOUNTER (OUTPATIENT)
Dept: PHYSICAL THERAPY | Age: 64
Setting detail: THERAPIES SERIES
Discharge: HOME | End: 2024-11-08
Payer: COMMERCIAL

## 2024-11-08 PROCEDURE — 97110 THERAPEUTIC EXERCISES: CPT

## 2024-11-08 PROCEDURE — 97140 MANUAL THERAPY 1/> REGIONS: CPT

## 2024-11-08 NOTE — PROGRESS NOTES
Phone: [phone]                 [facility]           Fax: [phone]                           Outpatient Physical Therapy                                                                            Daily Note    Patient: Hina Pollock : 1960  [facility] #: [id number]   Referring Physician: Shiv Tuttle MD  Date: 2024    Diagnosis: L knee OA, M17.12  Treatment Diagnosis: L knee pain    Onset Date: 10/03/24  PT Insurance Information: [employer]  Total # of Visits Approved: 24 Per Physician Order  Total # of Visits to Date: 15  No Show: 0  Canceled Appointment: 0    24 Plan of Care/Recert Due    Pre-Treatment Pain:  2/10  Subjective: Pt reports 2/10 L knee pain today. Pt states she was more sore after last visit.    Exercises:  Exercise 1: HEP: heel prop, quad sets, heel slides, LAQ, seated heel slides  Exercise 2: Scifit x 10 min 4.0  Exercise 3: Step stretches flex/ext 3x30\", slantboard stretch 3x30\"  Exercise 4: StarTrac flex 3 pl SL x15, ext 3pl, DL x15  Exercise 5: Cal retro walk 10# x10  Exercise 7: Sit/stands 2x10, holding 8# weight  Exercise 8: TKE with BTB x20  Exercise 9: FSU--6\" 15x, LSU 6\" x15, step downs x10, 4in  Exercise 13: Prone strap stretch 3x30\"    Manual:  Other: Passive stretching for flexion/Extension  in supine position    Modality:     Modality Flow Sheet:   Performed (X) Tx Modality   x Cold Pack: x10 L knee for pain/soreness     Assessment  Assessment: Pt continues to ambulate with antalgic gait pattern, lacking TKE on L LE. PROM completed for improved ROM with increased pressure each time. CP post tx for pain. Continue as pt tolerates.    Activity Tolerance  Activity Tolerance: Patient tolerated treatment well    Patient Education  Patient Education: Educated on completing prone knee flexion for quad stretch at home.  Pt verbalized/demonstrated good understanding:     [x] Yes         [] No, pt required further clarification.       Post Treatment Pain:

## 2024-11-11 ENCOUNTER — HOSPITAL ENCOUNTER (OUTPATIENT)
Dept: PHYSICAL THERAPY | Age: 64
Setting detail: THERAPIES SERIES
Discharge: HOME | End: 2024-11-11
Payer: MEDICARE

## 2024-11-11 PROCEDURE — 97140 MANUAL THERAPY 1/> REGIONS: CPT

## 2024-11-11 PROCEDURE — 97110 THERAPEUTIC EXERCISES: CPT

## 2024-11-11 NOTE — PROGRESS NOTES
knee ROM and strength.-met  Short Term Goal 2: Patient to have improved L knee ROM 5-90* for improved mobility.-progressing (6-103* on 11/1)  Short Term Goal 3: Patient to tolerate manual techniques/modalities to decrease L knee pain and edema and improve mobility.-met    Long Term Goals  Time Frame for Long Term Goals : 6 weeks  Long Term Goal 1: Patient to be independent and compliant with HEP.-progressing  Long Term Goal 2: Patient to have improved L knee ROM 0-120* for improved mobility.-progressing  Long Term Goal 3: Patient to have improved L LE strength >/=4+/5 grossly for improved stability.-progressing  Long Term Goal 4: Patient to be able to walk outdoors with no AD, no gait deviations and no increase in knee pain to return to PLOF.-progressing    Minutes Tracking:  Time In: 1015  Time Out: 1100  Minutes: 45  Timed Code Treatment Minutes: 43 Minutes    TWAN BUCHANAN, PT, DPT     Date: 11/11/2024

## 2024-11-13 ENCOUNTER — HOSPITAL ENCOUNTER (OUTPATIENT)
Dept: PHYSICAL THERAPY | Age: 64
Setting detail: THERAPIES SERIES
Discharge: HOME | End: 2024-11-13
Payer: MEDICARE

## 2024-11-13 PROCEDURE — 97110 THERAPEUTIC EXERCISES: CPT

## 2024-11-13 NOTE — PROGRESS NOTES
Phone: [phone]                 [facility]           Fax: [phone]                           Outpatient Physical Therapy                                                                            Daily Note    Patient: Hina Pollock : 1960  CSN #: [id number]   Referring Physician: Shiv Tuttle MD  Date: 2024    Diagnosis: L knee OA, M17.12  Treatment Diagnosis: L knee pain    Onset Date: 10/03/24  PT Insurance Information: A[employer]na  Total # of Visits Approved: 24 Per Physician Order  Total # of Visits to Date: 17  No Show: 0  Canceled Appointment: 0    24 Plan of Care/Recert Due    Pre-Treatment Pain:  1/10  Subjective: Pt reports she left her cane in the car today, feeling pretty good.    Exercises:  Exercise 1: HEP: heel prop, quad sets, heel slides, LAQ, seated heel slides  Exercise 2: Scifit x 10 min 4.0 (focus on knee flex ROM going up one notch at a time throughout-104* knee flex achieved)  Exercise 3: Step stretches flex/ext 2x60\" (3 way HS stretch), slantboard stretch x60\"  Exercise 6: Seated L HS stretch with quad set [id number]  Exercise 7: Sit/stands 2x10, holding 8# weight  Exercise 12: Prone TKE 5 sec holdx10; prone knee ext stretch with HP on hamstrings x4min    Modality: Cold pack x10 min to knee in elevation to decrease edema    Assessment  Assessment: Pt with improved L knee ROM after stretching today: 3-104*. Educated patient to trial prone knee ext stretch with HP on hamstrings, as well as seated HS stretch with quad set concurrently. Pt with good understanding. Will continue.    Activity Tolerance  Activity Tolerance: Patient tolerated treatment well    Patient Education  Exercise technique and progression   Pt verbalized/demonstrated good understanding:     [x] Yes         [] No, pt required further clarification.       Post Treatment Pain:  1/10      Plan  Plan Frequency: 3  Plan weeks: 4       Goals  (Total # of Visits to Date: 17)      Short Term Goals  Time

## 2024-11-15 ENCOUNTER — HOSPITAL ENCOUNTER (OUTPATIENT)
Dept: PHYSICAL THERAPY | Age: 64
Setting detail: THERAPIES SERIES
Discharge: HOME | End: 2024-11-15
Payer: MEDICARE

## 2024-11-15 PROCEDURE — 97110 THERAPEUTIC EXERCISES: CPT

## 2024-11-15 PROCEDURE — 97140 MANUAL THERAPY 1/> REGIONS: CPT

## 2024-11-15 NOTE — PROGRESS NOTES
Phone: [phone]                 [facility]           Fax: [phone]                           Outpatient Physical Therapy                                                                            Daily Note    Patient: Hina Pollock : 1960  [facility] #: [id number]   Referring Physician: Shiv Tuttle MD  Date: 11/15/2024    Diagnosis: L knee OA, M17.12  Treatment Diagnosis: L knee pain    Onset Date: 10/03/24  PT Insurance Information: A[employer]na  Total # of Visits Approved: 24 Per Physician Order  Total # of Visits to Date: 18  No Show: 0  Canceled Appointment: 0    24 Plan of Care/Recert Due    Pre-Treatment Pain:  2/10  Subjective: Pt reports 2/10 pain today. Pt states she went back to work this week on the days she is not in therapy.    Exercises:  Exercise 1: HEP: heel prop, quad sets, heel slides, LAQ, seated heel slides  Exercise 2: Scifit x 10 min 4.0 (focus on knee flex ROM going up one notch at a time throughout-104* knee flex achieved)  Exercise 3: Step stretches flex/ext 2x60\" (3 way HS stretch), slantboard stretch x60\"  Exercise 7: Sit/stands 2x15, holding 8# weight  Exercise 8: TKE with small red ball 5\" hold x 10  Exercise 12: Prone TKE 5 sec holdx10; prone knee ext stretch with HP on hamstrings x4min 2#  Exercise 13: Prone strap stretch 3x30\"  Exercise 14: Prone HS curl with OTB 2x10    Manual:  Joint Mobilization: tib/femoral PA grade III/IV, knee flexion with joint gapping/overpressure  Other: Passive stretching for flexion/Extension  in supine position    Modality:     Modality Flow Sheet:   Performed (X) Tx Modality   x Cold Pack: x10 min L knee for soreness     Assessment  Assessment: Pt with good tolerance to tx this visit. pt continues to demo painful knee flexion and requires VCs for technique and to decrease compensation. CP post tx for soreness. Continue as pt tolerates.    Activity Tolerance  Activity Tolerance: Patient tolerated treatment well    Patient

## 2024-11-18 ENCOUNTER — HOSPITAL ENCOUNTER (OUTPATIENT)
Dept: PHYSICAL THERAPY | Age: 64
Setting detail: THERAPIES SERIES
Discharge: HOME | End: 2024-11-18
Payer: MEDICARE

## 2024-11-18 PROCEDURE — 97110 THERAPEUTIC EXERCISES: CPT

## 2024-11-18 NOTE — PROGRESS NOTES
Visits to Date: 19)      Short Term Goals  Time Frame for Short Term Goals: 3 weeks  Short Term Goal 1: Patient to initiate HEP for improved L knee ROM and strength.-met  Short Term Goal 2: Patient to have improved L knee ROM 5-90* for improved mobility.-met (3-104* on 11/13)  Short Term Goal 3: Patient to tolerate manual techniques/modalities to decrease L knee pain and edema and improve mobility.-met    Long Term Goals  Time Frame for Long Term Goals : 6 weeks  Long Term Goal 1: Patient to be independent and compliant with HEP.-progressing  Long Term Goal 2: Patient to have improved L knee ROM 0-120* for improved mobility.-progressing  Long Term Goal 3: Patient to have improved L LE strength >/=4+/5 grossly for improved stability.-progressing  Long Term Goal 4: Patient to be able to walk outdoors with no AD, no gait deviations and no increase in knee pain to return to PLOF.-progressing    Minutes Tracking:  Time In: 1131  Time Out: 1222  Minutes: 51  Timed Code Treatment Minutes: 41 Minutes      Sergio Atkinson PT, DPT     Date: 11/18/2024

## 2024-11-20 ENCOUNTER — HOSPITAL ENCOUNTER (OUTPATIENT)
Dept: PHYSICAL THERAPY | Age: 64
Setting detail: THERAPIES SERIES
Discharge: HOME | End: 2024-11-20
Payer: MEDICARE

## 2024-11-20 PROCEDURE — 97110 THERAPEUTIC EXERCISES: CPT

## 2024-11-20 PROCEDURE — 97140 MANUAL THERAPY 1/> REGIONS: CPT

## 2024-11-21 ENCOUNTER — HOSPITAL ENCOUNTER (OUTPATIENT)
Dept: PHYSICAL THERAPY | Age: 64
Setting detail: THERAPIES SERIES
Discharge: HOME | End: 2024-11-21
Payer: MEDICARE

## 2024-11-21 PROCEDURE — 97110 THERAPEUTIC EXERCISES: CPT

## 2024-11-21 NOTE — PROGRESS NOTES
Phone: [phone]                 [facility]           Fax: [phone]                           Outpatient Physical Therapy                                                                            Daily Note    Patient: Hina Pollock : 1960  [facility] #: [id number]   Referring Physician: Shiv Tuttle MD  Date: 2024       Treatment Diagnosis: L knee pain    Onset Date: 10/03/24  PT Insurance Information: Aetna  Total # of Visits Approved: 24 Per Physician Order  Total # of Visits to Date: 21  No Show: 0  Canceled Appointment: 0    24 Plan of Care/Recert Due    Pre-Treatment Pain:  0/10  Subjective: Patient denies pain only stiffness/soreness and \"irritating\"    Exercises:  Exercise 1: HEP: heel prop, quad sets, heel slides, LAQ, seated heel slides  Exercise 2: Scifit x 10 min 4.0 (focus on knee flex ROM going up one notch at a time throughout-104* knee flex achieved)  Exercise 3: Step stretches flex/ext 2x60\" (3 way HS stretch), slantboard stretch x60\"  Exercise 5: Cal retro walk 15.5# x10, lat walk outs 10# x3  Exercise 9: FSU 8\" x20, LSU 8\" x15, step downs 6\" x15  Exercise 14: Cybex leg press 2x10, L SL, 3 plates  Exercise 15: 12in romie fwd/lat step over 2 x 10    Manual:  Other: Passive stretching for flexion/Extension  in supine position    Modality: CP applied to anterior knee to reduce edema/discomfort in conjunction with MHP applied to L HS to reduce muscle tone.     Assessment  Assessment: Continued to progress ROM ther ex this date with verbal cues to reduce hip compensation with leg press. Pt continues to lack TKE with ambulation this date. CP/MHP applied to L knee in supine to reduce discomfort. Will continue.    Activity Tolerance  Activity Tolerance: Patient tolerated treatment well    Patient Education  Patient Education: continued stretch frequency at home  Pt verbalized/demonstrated good understanding:     [x] Yes         [] No, pt required further clarification.

## 2024-11-21 NOTE — PROGRESS NOTES
Phone: [phone]                 [facility]           Fax: [phone]                           Outpatient Physical Therapy                                                                            Daily Note    Patient: Hina Pollock : 1960  [facility] #: [id number]   Referring Physician: Shiv Tuttle MD  Date: 2024       Treatment Diagnosis: L knee pain    Onset Date: 10/03/24  PT Insurance Information: Aetna  Total # of Visits Approved: 24 Per Physician Order  Total # of Visits to Date: 20  No Show: 0  Canceled Appointment: 0    24 Plan of Care/Recert Due    Pre-Treatment Pain:  2/10  Subjective: Patient reports sitting for long periods of time yesterday that increased soreness. After moving around today L knee pain has reduced.  L knee pain 2/10 prior to session.    Exercises:  Exercise 1: HEP: heel prop, quad sets, heel slides, LAQ, seated heel slides  Exercise 2: Scifit x 10 min 4.0 (focus on knee flex ROM going up one notch at a time throughout-104* knee flex achieved)  Exercise 3: Step stretches flex/ext 2x60\" (3 way HS stretch), slantboard stretch x60\"  Exercise 5: Cal retro walk 12# x10, lat walk outs 8# x3  Exercise 7: Sit/stands 2x15, holding 8# weight  Exercise 14: Cybex leg press 2x10, L SL, 3 plates  Exercise 15: 10in romie fwd/lat step over 2 x 10    Manual:  Joint Mobilization: tib/femoral PA grade III/IV, knee flexion with joint gapping/overpressure  Other: Passive stretching for flexion/Extension  in supine position    Modality: CP/MHP applied to L knee to reduce muscle soreness/pain.       Assessment  Assessment: Continued with charted exercises with focus on ROM with good tolerance. Progressed romie height this date with verbal cues to reduce hip compensation. CP applied to anterior knee in conjunction with MHP applied to posterior knee/HS post session with relief noted. Continue per tolerance.    Activity Tolerance  Activity Tolerance: Patient tolerated

## 2024-11-27 ENCOUNTER — HOSPITAL ENCOUNTER (OUTPATIENT)
Dept: PHYSICAL THERAPY | Age: 64
Setting detail: THERAPIES SERIES
Discharge: HOME | End: 2024-11-27
Payer: COMMERCIAL

## 2024-11-27 PROCEDURE — 97140 MANUAL THERAPY 1/> REGIONS: CPT

## 2024-11-27 PROCEDURE — 97110 THERAPEUTIC EXERCISES: CPT

## 2024-11-27 NOTE — PROGRESS NOTES
4       Goals  (Total # of Visits to Date: 22)      Short Term Goals  Time Frame for Short Term Goals: 3 weeks  Short Term Goal 1: Patient to initiate HEP for improved L knee ROM and strength.-met  Short Term Goal 2: Patient to have improved L knee ROM 5-90* for improved mobility.-met (3-104* on 11/13)  Short Term Goal 3: Patient to tolerate manual techniques/modalities to decrease L knee pain and edema and improve mobility.-met    Long Term Goals  Long Term Goal 1: Patient to be independent and compliant with HEP.-progressing  Long Term Goal 2: Patient to have improved L knee ROM 0-120* for improved mobility.-progressing  Long Term Goal 3: Patient to have improved L LE strength >/=4+/5 grossly for improved stability.-progressing  Long Term Goal 4: Patient to be able to walk outdoors with no AD, no gait deviations and no increase in knee pain to return to PLOF.-progressing    Minutes Tracking:  Time In: 1117  Time Out: 1206  Minutes: 49      Esther Mon PTA     Date: 11/27/2024

## 2024-11-29 ENCOUNTER — APPOINTMENT (OUTPATIENT)
Dept: PHYSICAL THERAPY | Age: 64
End: 2024-11-29
Payer: COMMERCIAL

## 2024-12-02 ENCOUNTER — HOSPITAL ENCOUNTER (OUTPATIENT)
Dept: PHYSICAL THERAPY | Age: 64
Setting detail: THERAPIES SERIES
Discharge: HOME | End: 2024-12-02
Payer: MEDICARE

## 2024-12-02 PROCEDURE — 97140 MANUAL THERAPY 1/> REGIONS: CPT

## 2024-12-02 PROCEDURE — 97110 THERAPEUTIC EXERCISES: CPT

## 2024-12-02 NOTE — PROGRESS NOTES
Phone: [phone]                 [facility]           Fax: [phone]                           Outpatient Physical Therapy                                                                            Daily Note    Patient: Hina Pollock : 1960  [facility] #: [id number]   Referring Physician: Shiv Tuttle MD  Date: 2024    Diagnosis: L knee OA, M17.12  Treatment Diagnosis: L knee pain    Onset Date: 10/03/24  PT Insurance Information: Aetna  Total # of Visits Approved: 24 Per Physician Order  Total # of Visits to Date: 23  No Show: 0  Canceled Appointment: 0    24 Plan of Care/Recert Due    Pre-Treatment Pain:  0/10  Subjective: Pt reports continued stiffness in L knee today. She went to her doctor and is going to have a manipulation done on Thursday.    Exercises:  Exercise 2: Scifit x 10 min 4.0 (focus on knee flex ROM going up one notch at a time throughout-104* knee flex achieved)  Exercise 5: Cal retro walk 15.5# x10, lat walk outs 10# x3 (only retro walk today)  Exercise 7: Sit/stands 2x15, holding 8# weight  Exercise 9: FSU 8\" x20, LSU 8\" x15, step downs 6\" x15  Exercise 15: 12in romie fwd/lat step over 2 x 10    Manual:  Other: Passive stretching for flexion/Extension  in seated    Modality:   MHP on posterior L knee to reduce muscle tone and CP on anterior L knee to reduce soreness x10 min post tx    Assessment  Assessment: Continued with stretches, ther ex, and manual PROM to improve mobility in L knee. Pt reports she is having L knee manipulation this 24 and will be returning for therapy after.    Activity Tolerance  Activity Tolerance: Patient tolerated treatment well    Patient Education  Patient Education: continued stretch frequency at home  Pt verbalized/demonstrated good understanding:     [x] Yes         [] No, pt required further clarification.    Post Treatment Pain:  0/10      Plan  Plan Frequency: 3  Plan weeks: 4       Goals  (Total # of Visits to

## 2024-12-03 NOTE — PROGRESS NOTES
Patient instructed on the pre-operative, intra-operative, and post-operative process. Patient instructed on NPO status. Medication instructions and pre operative instruction sheet reviewed with the patient. CHG skin prep instructions reviewed with patient. Patient will only take her pepcid the morning of her procedure.

## 2024-12-04 ENCOUNTER — ANESTHESIA EVENT (OUTPATIENT)
Dept: OPERATING ROOM | Age: 64
End: 2024-12-04
Payer: MEDICARE

## 2024-12-05 ENCOUNTER — HOSPITAL ENCOUNTER (OUTPATIENT)
Age: 64
Setting detail: OUTPATIENT SURGERY
Discharge: HOME | End: 2024-12-05
Attending: ORTHOPAEDIC SURGERY | Admitting: ORTHOPAEDIC SURGERY
Payer: COMMERCIAL

## 2024-12-05 ENCOUNTER — ANESTHESIA (OUTPATIENT)
Dept: OPERATING ROOM | Age: 64
End: 2024-12-05
Payer: MEDICARE

## 2024-12-05 VITALS
HEART RATE: 70 BPM | WEIGHT: 173.4 LBS | RESPIRATION RATE: 16 BRPM | OXYGEN SATURATION: 96 % | TEMPERATURE: 97.2 F | BODY MASS INDEX: 27.87 KG/M2 | HEIGHT: 66 IN | SYSTOLIC BLOOD PRESSURE: 126 MMHG | DIASTOLIC BLOOD PRESSURE: 63 MMHG

## 2024-12-05 DIAGNOSIS — Z98.890 S/P SURGICAL MANIPULATION OF KNEE JOINT: Primary | ICD-10-CM

## 2024-12-05 DIAGNOSIS — Z96.652 S/P TOTAL KNEE ARTHROPLASTY, LEFT: ICD-10-CM

## 2024-12-05 LAB — GLUCOSE BLD-MCNC: 120 MG/DL (ref 74–100)

## 2024-12-05 PROCEDURE — 82947 ASSAY GLUCOSE BLOOD QUANT: CPT

## 2024-12-05 PROCEDURE — 3600000002 HC SURGERY LEVEL 2 BASE: Performed by: ORTHOPAEDIC SURGERY

## 2024-12-05 PROCEDURE — 2709999900 HC NON-CHARGEABLE SUPPLY: Performed by: ORTHOPAEDIC SURGERY

## 2024-12-05 PROCEDURE — 7100000010 HC PHASE II RECOVERY - FIRST 15 MIN: Performed by: ORTHOPAEDIC SURGERY

## 2024-12-05 PROCEDURE — 64999 UNLISTED PX NERVOUS SYSTEM: CPT | Performed by: NURSE ANESTHETIST, CERTIFIED REGISTERED

## 2024-12-05 PROCEDURE — 3700000000 HC ANESTHESIA ATTENDED CARE: Performed by: ORTHOPAEDIC SURGERY

## 2024-12-05 PROCEDURE — 6370000000 HC RX 637 (ALT 250 FOR IP): Performed by: NURSE ANESTHETIST, CERTIFIED REGISTERED

## 2024-12-05 PROCEDURE — 2580000003 HC RX 258: Performed by: NURSE ANESTHETIST, CERTIFIED REGISTERED

## 2024-12-05 PROCEDURE — 6360000002 HC RX W HCPCS: Performed by: NURSE ANESTHETIST, CERTIFIED REGISTERED

## 2024-12-05 PROCEDURE — 7100000011 HC PHASE II RECOVERY - ADDTL 15 MIN: Performed by: ORTHOPAEDIC SURGERY

## 2024-12-05 RX ORDER — SODIUM CHLORIDE 0.9 % (FLUSH) 0.9 %
5-40 SYRINGE (ML) INJECTION PRN
Status: DISCONTINUED | OUTPATIENT
Start: 2024-12-05 | End: 2024-12-05 | Stop reason: HOSPADM

## 2024-12-05 RX ORDER — LIDOCAINE HYDROCHLORIDE 20 MG/ML
INJECTION, SOLUTION EPIDURAL; INFILTRATION; INTRACAUDAL; PERINEURAL
Status: DISCONTINUED | OUTPATIENT
Start: 2024-12-05 | End: 2024-12-05 | Stop reason: SDUPTHER

## 2024-12-05 RX ORDER — SODIUM CHLORIDE 0.9 % (FLUSH) 0.9 %
5-40 SYRINGE (ML) INJECTION EVERY 12 HOURS SCHEDULED
Status: DISCONTINUED | OUTPATIENT
Start: 2024-12-05 | End: 2024-12-05 | Stop reason: HOSPADM

## 2024-12-05 RX ORDER — SODIUM CHLORIDE 9 MG/ML
INJECTION, SOLUTION INTRAVENOUS PRN
Status: DISCONTINUED | OUTPATIENT
Start: 2024-12-05 | End: 2024-12-05 | Stop reason: HOSPADM

## 2024-12-05 RX ORDER — FENTANYL CITRATE 50 UG/ML
INJECTION, SOLUTION INTRAMUSCULAR; INTRAVENOUS
Status: DISCONTINUED | OUTPATIENT
Start: 2024-12-05 | End: 2024-12-05 | Stop reason: SDUPTHER

## 2024-12-05 RX ORDER — ROPIVACAINE HYDROCHLORIDE 5 MG/ML
INJECTION, SOLUTION EPIDURAL; INFILTRATION; PERINEURAL
Status: DISCONTINUED | OUTPATIENT
Start: 2024-12-05 | End: 2024-12-05 | Stop reason: SDUPTHER

## 2024-12-05 RX ORDER — DIMENHYDRINATE 50 MG
50 TABLET ORAL ONCE
Status: COMPLETED | OUTPATIENT
Start: 2024-12-05 | End: 2024-12-05

## 2024-12-05 RX ORDER — PROPOFOL 10 MG/ML
INJECTION, EMULSION INTRAVENOUS
Status: DISCONTINUED | OUTPATIENT
Start: 2024-12-05 | End: 2024-12-05 | Stop reason: SDUPTHER

## 2024-12-05 RX ORDER — OXYCODONE HYDROCHLORIDE 5 MG/1
5 CAPSULE ORAL EVERY 6 HOURS PRN
Qty: 20 CAPSULE | Refills: 0 | Status: SHIPPED | OUTPATIENT
Start: 2024-12-05 | End: 2024-12-10

## 2024-12-05 RX ORDER — SODIUM CHLORIDE 9 MG/ML
INJECTION, SOLUTION INTRAMUSCULAR; INTRAVENOUS; SUBCUTANEOUS
Status: DISCONTINUED | OUTPATIENT
Start: 2024-12-05 | End: 2024-12-05 | Stop reason: SDUPTHER

## 2024-12-05 RX ORDER — MIDAZOLAM HYDROCHLORIDE 1 MG/ML
INJECTION, SOLUTION INTRAMUSCULAR; INTRAVENOUS
Status: DISCONTINUED | OUTPATIENT
Start: 2024-12-05 | End: 2024-12-05 | Stop reason: SDUPTHER

## 2024-12-05 RX ORDER — NALOXONE HYDROCHLORIDE 0.4 MG/ML
INJECTION, SOLUTION INTRAMUSCULAR; INTRAVENOUS; SUBCUTANEOUS PRN
Status: DISCONTINUED | OUTPATIENT
Start: 2024-12-05 | End: 2024-12-05 | Stop reason: HOSPADM

## 2024-12-05 RX ORDER — ACETAMINOPHEN 325 MG/1
650 TABLET ORAL ONCE
Status: COMPLETED | OUTPATIENT
Start: 2024-12-05 | End: 2024-12-05

## 2024-12-05 RX ORDER — DEXAMETHASONE SODIUM PHOSPHATE 10 MG/ML
INJECTION, SOLUTION INTRAMUSCULAR; INTRAVENOUS
Status: DISCONTINUED | OUTPATIENT
Start: 2024-12-05 | End: 2024-12-05 | Stop reason: SDUPTHER

## 2024-12-05 RX ORDER — SODIUM CHLORIDE 9 MG/ML
INJECTION, SOLUTION INTRAVENOUS CONTINUOUS
Status: DISCONTINUED | OUTPATIENT
Start: 2024-12-05 | End: 2024-12-05 | Stop reason: HOSPADM

## 2024-12-05 RX ADMIN — SODIUM CHLORIDE 10 ML: 9 INJECTION, SOLUTION INTRAMUSCULAR; INTRAVENOUS; SUBCUTANEOUS at 07:28

## 2024-12-05 RX ADMIN — SODIUM CHLORIDE 5 ML: 9 INJECTION, SOLUTION INTRAMUSCULAR; INTRAVENOUS; SUBCUTANEOUS at 07:22

## 2024-12-05 RX ADMIN — ROPIVACAINE HYDROCHLORIDE 5 ML: 5 INJECTION EPIDURAL; INFILTRATION; PERINEURAL at 07:22

## 2024-12-05 RX ADMIN — SODIUM CHLORIDE 5 ML: 9 INJECTION, SOLUTION INTRAMUSCULAR; INTRAVENOUS; SUBCUTANEOUS at 07:26

## 2024-12-05 RX ADMIN — ROPIVACAINE HYDROCHLORIDE 10 ML: 5 INJECTION EPIDURAL; INFILTRATION; PERINEURAL at 07:28

## 2024-12-05 RX ADMIN — DEXAMETHASONE SODIUM PHOSPHATE 5 MG: 10 INJECTION, SOLUTION INTRAMUSCULAR; INTRAVENOUS at 07:24

## 2024-12-05 RX ADMIN — ROPIVACAINE HYDROCHLORIDE 5 ML: 5 INJECTION EPIDURAL; INFILTRATION; PERINEURAL at 07:24

## 2024-12-05 RX ADMIN — LIDOCAINE HYDROCHLORIDE 100 MG: 20 INJECTION, SOLUTION EPIDURAL; INFILTRATION; INTRACAUDAL; PERINEURAL at 07:32

## 2024-12-05 RX ADMIN — DEXAMETHASONE SODIUM PHOSPHATE 5 MG: 10 INJECTION, SOLUTION INTRAMUSCULAR; INTRAVENOUS at 07:22

## 2024-12-05 RX ADMIN — MIDAZOLAM 2 MG: 1 INJECTION INTRAMUSCULAR; INTRAVENOUS at 07:18

## 2024-12-05 RX ADMIN — SODIUM CHLORIDE 5 ML: 9 INJECTION, SOLUTION INTRAMUSCULAR; INTRAVENOUS; SUBCUTANEOUS at 07:24

## 2024-12-05 RX ADMIN — PROPOFOL 70 MG: 10 INJECTION, EMULSION INTRAVENOUS at 07:32

## 2024-12-05 RX ADMIN — ROPIVACAINE HYDROCHLORIDE 10 ML: 5 INJECTION EPIDURAL; INFILTRATION; PERINEURAL at 07:26

## 2024-12-05 RX ADMIN — ACETAMINOPHEN 650 MG: 325 TABLET ORAL at 06:36

## 2024-12-05 RX ADMIN — DIMENHYDRINATE 50 MG: 50 TABLET ORAL at 06:36

## 2024-12-05 RX ADMIN — FENTANYL CITRATE 100 MCG: 50 INJECTION INTRAMUSCULAR; INTRAVENOUS at 07:18

## 2024-12-05 ASSESSMENT — PAIN - FUNCTIONAL ASSESSMENT
PAIN_FUNCTIONAL_ASSESSMENT: 0-10
PAIN_FUNCTIONAL_ASSESSMENT: FACE, LEGS, ACTIVITY, CRY, AND CONSOLABILITY (FLACC)
PAIN_FUNCTIONAL_ASSESSMENT: 0-10

## 2024-12-05 ASSESSMENT — LIFESTYLE VARIABLES: SMOKING_STATUS: 0

## 2024-12-05 ASSESSMENT — PAIN DESCRIPTION - DESCRIPTORS
DESCRIPTORS: ACHING
DESCRIPTORS: DISCOMFORT

## 2024-12-05 NOTE — ANESTHESIA PRE PROCEDURE
02:24 PM    RBC 4.41 09/16/2024 02:24 PM    RBC 3.61 04/02/2024 11:52 AM    HGB 12.1 10/04/2024 06:16 AM    HCT 35.1 10/04/2024 06:16 AM    MCV 94.6 09/16/2024 02:24 PM    RDW 12.2 09/16/2024 02:24 PM     09/16/2024 02:24 PM       CMP:   Lab Results   Component Value Date/Time     10/04/2024 06:16 AM    K 4.3 10/04/2024 06:16 AM     10/04/2024 06:16 AM    CO2 25 10/04/2024 06:16 AM    BUN 20 10/04/2024 06:16 AM    CREATININE 1.0 10/04/2024 06:16 AM    GFRAA >60 06/21/2022 02:45 PM    LABGLOM 63 10/04/2024 06:16 AM    GLUCOSE 142 10/04/2024 06:16 AM    GLUCOSE 135 03/13/2024 06:05 AM    CALCIUM 9.0 10/04/2024 06:16 AM    BILITOT 0.3 09/16/2024 02:24 PM    ALKPHOS 86 09/16/2024 02:24 PM    AST 17 09/16/2024 02:24 PM    ALT 21 09/16/2024 02:24 PM       POC Tests: No results for input(s): \"POCGLU\", \"POCNA\", \"POCK\", \"POCCL\", \"POCBUN\", \"POCHEMO\", \"POCHCT\" in the last 72 hours.      Coags:   Lab Results   Component Value Date/Time    PROTIME 12.9 09/16/2024 02:24 PM    INR 1.0 09/16/2024 02:24 PM    APTT 28.2 09/16/2024 02:24 PM    APTT 29 02/19/2024 10:32 AM       HCG (If Applicable): No results found for: \"PREGTESTUR\", \"PREGSERUM\", \"HCG\", \"HCGQUANT\"     ABGs: No results found for: \"PHART\", \"PO2ART\", \"TZK6VSB\", \"MUN1YAW\", \"BEART\", \"W8WBLQHH\"     Type & Screen (If Applicable):  No results found for: \"LABABO\"      Drug/Infectious Status (If Applicable):  Lab Results   Component Value Date/Time    HEPCAB Nonreactive 06/27/2022 10:00 AM       COVID-19 Screening (If Applicable): No results found for: \"COVID19\"        Anesthesia Evaluation  Patient summary reviewed   no history of anesthetic complications:   Airway: Mallampati: II  TM distance: >3 FB   Neck ROM: full  Mouth opening: > = 3 FB   Dental: normal exam         Pulmonary:Negative Pulmonary ROS and normal exam  breath sounds clear to auscultation      (-) not a current smoker                           Cardiovascular:Negative CV ROS  Exercise

## 2024-12-05 NOTE — DISCHARGE INSTRUCTIONS
Weight Bearing As Torerated to operative leg. Ice and elevation (with pillow/prop under HEEL ONLY, NOTHING UNDER KNEE). Start PT by tomorrow.        SAME DAY SURGERY DISCHARGE INSTRUCTIONS    1.  Do not drive or operate hazardous machinery for 24 hours.    2.  Do not make important personal or business decisions for 24 hours.    3.  Do not drink alcoholic beverages for 24 hours.    4.  Do not smoke tobacco products for 24 hours.    5.  Eat light foods (Jell-O, soups, etc....) and drink plenty of fluids (water, Sprite, etc...) up to 8 glasses per day, as you can tolerate.    6.  If your bandages become soaked with bright red blood, place another dressing pad over your bandages.  (DO NOT remove original bandage.)  Call your surgeon for further instructions.  A small amount of bright red blood is to be expected.    7.  Limit your activities for 24 hours.  Do not engage in heavy work until your surgeon gives you permission.      8.  Patient should not be left alone for 12-24 hours following surgical procedure.    9.  Report the following signs or any questions regarding your physical condition to your surgeon immediately:    Excessive swelling    Redness.    Temperature of 100 degrees (F) or above.    Excessive pain.    10.  Call your surgeon for any questions regarding your surgery.    11.  Wash hands before and after anytime touching your incision area of your left knee.

## 2024-12-05 NOTE — ANESTHESIA PROCEDURE NOTES
Peripheral Block    Patient location during procedure: pre-op  Reason for block: post-op pain management and at surgeon's request  Start time: 12/5/2024 7:20 AM  End time: 12/5/2024 7:26 AM  Staffing  Performed: resident/CRNA   Resident/CRNA: Sabrina Samuels APRN - CRNA  Other anesthesia staff: Kesha Hannah APRN - CRNA  Performed by: Sabrina Samuels APRN - CRNA  Authorized by: Sabrina Samuels APRN - CRNA    Preanesthetic Checklist  Completed: patient identified, IV checked, site marked, risks and benefits discussed, surgical/procedural consents, equipment checked, pre-op evaluation, timeout performed, anesthesia consent given, oxygen available, monitors applied/VS acknowledged, fire risk safety assessment completed and verbalized and blood product R/B/A discussed and consented  Peripheral Block   Patient position: supine  Prep: ChloraPrep  Provider prep: mask and sterile gloves  Patient monitoring: continuous pulse ox, responsive to questions and IV access  Block type: iPacks and Saphenous (genicular)  Laterality: left  Injection technique: single-shot  Guidance: ultrasound guided  Local infiltration: ropivacaine and decadron  Infiltration strength: 0.5 %  Local infiltration: ropivacaine and decadron  Dose: 30 mLDose: 1 mL    Needle   Needle type: pajunk 80mm TAP.   Assessment   Injection assessment: negative aspiration for heme, no paresthesia on injection, local visualized surrounding nerve on ultrasound and no intravascular symptoms  Paresthesia pain: none  Slow fractionated injection: yes  Hemodynamics: stable  Outcomes: uncomplicated and patient tolerated procedure well

## 2024-12-05 NOTE — OP NOTE
Department of Orthopaedic Surgery  Operative Report      Patient:  Hina Pollock    YOB: 1960    MRN:  [id number]    Date of Surgery:  12/5/2024    Pre-operative Diagnosis:  1.  Left knee arthrofibrosis  2.  Status post left total knee arthroplasty    Post-operative Diagnosis:    1.  Left knee arthrofibrosis  2.  Status post left total knee arthroplasty    Procedure:    1.  Left knee manipulation under anesthesia    Surgeon:  Shiv Tuttle MD     Assistant(s): None    Anesthesia: Sedation    Estimated blood loss: 0 mL    Fluids: 0 mL    Urine: 0 mL    Specimens: None    Findings: Preoperative range of motion 0 to 100 degrees.  Postmanipulation range of motion 0 to 120 degrees.    Complications: None    Condition: Stable    Indications for Surgery: Patient is a 64-year-old female who presented for evaluation of stiff left total knee arthroplasty.  Patient about left total knee arthroplasty 10/3/2024.  She was progressing with physical therapy but unable to progress past 100 degrees of flexion.  Given her persistent stiffness despite therapy discussion was had with patient regarding surgical intervention with manipulation under anesthesia to release adhesions.  Discussed procedure, risk, benefits, and alternatives including but not limited to fracture, stiffness, continued pain, need for additional surgery, and risk of anesthesia.  Patient understood the risks and elected to proceed with surgery.    Implants: None    Procedure:  Patient was identified and greeted in the preoperative holding area. The correct surgical site was identified and marked. Surgical consent was obtained and placed on the chart. Patient was taken to the surgical suite and transferred to the operating room table.  Patient received sedation per anesthesia.  Surgical timeout was performed confirming correct surgical site, patient, and procedure.  The knee was taken through range of motion once patient was fully relaxed and noted to have

## 2024-12-05 NOTE — ANESTHESIA POSTPROCEDURE EVALUATION
Department of Anesthesiology  Postprocedure Note    Patient: Hina Pollock  MRN: [id number]  YOB: 1960  Date of evaluation: 12/5/2024    Procedure Summary       Date: 12/05/24 Room / Location: [address]    Anesthesia Start: 0729 Anesthesia Stop: 0741    Procedure: KNEE MANIPULATION- UNDER ANESTHESIA (Left: Knee) Diagnosis:       Arthrofibrosis of knee joint, left      (Arthrofibrosis of knee joint, left [M24.662])    Surgeons: Shiv Tuttle MD Responsible Provider: Sabrina Samuels APRN - CRNA    Anesthesia Type: general, TIVA ASA Status: 3            Anesthesia Type: No value filed.    Ramya Phase I: Ramya Score: 10    Ramya Phase II: Ramya Score: 9    Anesthesia Post Evaluation    Patient location during evaluation: PACU  Patient participation: complete - patient participated  Level of consciousness: awake and alert  Pain score: 4  Airway patency: patent  Nausea & Vomiting: no nausea and no vomiting  Cardiovascular status: blood pressure returned to baseline and hemodynamically stable  Respiratory status: acceptable  Hydration status: euvolemic  Multimodal analgesia pain management approach  Pain management: adequate        No notable events documented.

## 2024-12-06 ENCOUNTER — HOSPITAL ENCOUNTER (OUTPATIENT)
Dept: PHYSICAL THERAPY | Age: 64
Setting detail: THERAPIES SERIES
Discharge: HOME | End: 2024-12-06
Payer: MEDICARE

## 2024-12-06 PROCEDURE — 97110 THERAPEUTIC EXERCISES: CPT

## 2024-12-06 NOTE — PLAN OF CARE
[facility]           Phone: [phone]             Outpatient Physical Therapy  Fax: [phone]                                           Date: 2024  Patient: Hina Pollock : 1960 CSN #: [id number]   Referring Physician: Shiv Tuttle MD      [] Plan of Care   [x] Updated Plan of Care    Dates of Service to Include: 2024 to 25    Diagnosis:  L knee OA, M17.12     Rehab (Treatment) Diagnosis:  L knee pain         Onset Date:  10/03/24    Attendance  Total # of Visits to Date: 24 No Show: 0 Canceled Appointment: 0    Assessment  Assessment: Patient has attended 24 PT visits s/p L TKA and returns to PT following DIANA to improve L knee ROM and break up adhesions. Focused on L knee ROM into flexion and extension. Patient able to achieve 3-110* with overpressure after stretching today. Patient to benefit from continued PT every day following DIANA per doctor's order to improve knee ROM and return to PLOF.      Goals  Short Term Goals  Time Frame for Short Term Goals: 3 weeks  Short Term Goal 1: Patient to initiate HEP for improved L knee ROM and strength.-met  Short Term Goal 2: Patient to have improved L knee ROM 5-90* for improved mobility.-met (3-104* on )  Short Term Goal 3: Patient to tolerate manual techniques/modalities to decrease L knee pain and edema and improve mobility.-met  Long Term Goals  Time Frame for Long Term Goals : 6 weeks  Long Term Goal 1: Patient to be independent and compliant with HEP.-progressing  Long Term Goal 2: Patient to have improved L knee ROM 0-120* for improved mobility.-progressing  Long Term Goal 3: Patient to have improved L LE strength >/=4+/5 grossly for improved stability.-progressing  Long Term Goal 4: Patient to be able to walk outdoors with no AD, no gait deviations and no increase in knee pain to return to PLOF.-progressing     Prognosis  Therapy

## 2024-12-06 NOTE — PROGRESS NOTES
Phone: [phone]                 [facility]           Fax: [phone]                           Outpatient Physical Therapy                                                                            Daily Note    Patient: Hina Pollock : 1960  [facility] #: [id number]   Referring Physician: Shiv Tuttle MD  Date: 2024    Diagnosis: L knee OA, M17.12  Treatment Diagnosis: L knee pain    Onset Date: 10/03/24  PT Insurance Information: Aetna  Total # of Visits Approved: 38 Per Physician Order  Total # of Visits to Date:   No Show: 0  Canceled Appointment: 0    25 Plan of Care/Recert Due    Pre-Treatment Pain:  3/10  Subjective: Patient reports 3/10 pain in L knee with walking and is using her cane for safety after her manipulation yesterday.    Exercises:  Exercise 2: Scifit x 10 min 1.0 (focus on knee flex ROM going up one notch at a time throughout-104* knee flex achieved)  Exercise 3: Step stretches flex/ext 3x30\" (3 way HS stretch), slantboard stretch x60\"  Exercise 4: StarTrac flex 3 pl SL x15, ext 3pl, DL [id number]--[id number] today for 25# overpressure to improve L knee flexion ROM  Exercise 6: Seated L HS stretch with quad set [id number]  Exercise 8: TKE with small red ball 5\" hold x 10  Exercise 13: Prone knee flexion strap stretch 3x30\"      Modality: CP to L knee at end of session to decrease soreness and inflammation    Assessment  Assessment: Patient has attended 24 PT visits s/p L TKA and returns to PT following DIANA to improve L knee ROM and break up adhesions. Focused on L knee ROM into flexion and extension. Patient able to achieve 3-110* with overpressure after stretching today. Patient to benefit from continued PT every day following DIANA per doctor's order to improve knee ROM and return to PLOF.    Activity Tolerance  Activity Tolerance: Patient tolerated treatment well    Patient Education  Exercise technique, UPOC   Pt verbalized/demonstrated good understanding:     [x] Yes         []

## 2024-12-07 ENCOUNTER — HOSPITAL ENCOUNTER (OUTPATIENT)
Dept: PHYSICAL THERAPY | Age: 64
Setting detail: THERAPIES SERIES
Discharge: HOME | End: 2024-12-07
Payer: COMMERCIAL

## 2024-12-07 PROCEDURE — 97110 THERAPEUTIC EXERCISES: CPT

## 2024-12-07 NOTE — PROGRESS NOTES
Phone: [phone]                 [facility]           Fax: [phone]                           Outpatient Physical Therapy                                                                            Daily Note    Patient: Hina Pollock : 1960  [facility] #: [id number]   Referring Physician: Shiv Tuttle MD  Date: 2024      Treatment Diagnosis: L knee pain    Onset Date: 10/03/24  PT Insurance Information: Aetna  Total # of Visits Approved: 38 Per Physician Order  Total # of Visits to Date: 25  No Show: 0  Canceled Appointment: 0    25 Plan of Care/Recert Due    Pre-Treatment Pain:  3-4/10  Subjective: Pt. reports 3-4/10 L knee pain prior to therapy session this date.    Exercises:  Exercise 1: HEP: heel prop, quad sets, heel slides, LAQ, seated heel slides  Exercise 3: Step stretches flex/ext 3x30\" (3 way HS stretch), slantboard stretch x60\"--step stretches today  Exercise 6: Seated L HS stretch with quad set [id number] //LAQ x15  Exercise 7: Sit/stands 2x10, holding 8# weight  Exercise 8: TKE with small red ball 5\" hold x 10-- with OTB today  Exercise 9: FSU 8\" x20, LSU 8\" x15, step downs 6\" x15--no step downs  Exercise 10: seated knee flexion stretch with sliding board 10x10\" hold  Exercise 13: Prone knee flexion strap stretch 3x30\"  Exercise 16: Walked 200ft with SC  Exercise 17: Heel slides 10x10\" hold // heel prop with 4# weight for 1'      Assessment  Assessment: Continued to focus on L knee ROM of flexion and extension this date. Included seated knee flexion stretch with slide board with good carryover noted. Pt. was able to achieve 111* L knee flexion ROM this date after stretching this date. Will continue to progress as tolerated.    Activity Tolerance  Activity Tolerance: Patient tolerated treatment well    Patient Education  Patient Education: continued stretch frequency at home  Pt verbalized/demonstrated good understanding:     [x] Yes         [] No, pt required further

## 2024-12-08 ENCOUNTER — HOSPITAL ENCOUNTER (OUTPATIENT)
Dept: PHYSICAL THERAPY | Age: 64
Setting detail: THERAPIES SERIES
Discharge: HOME | End: 2024-12-08
Payer: COMMERCIAL

## 2024-12-08 PROCEDURE — 97110 THERAPEUTIC EXERCISES: CPT

## 2024-12-08 NOTE — PROGRESS NOTES
Phone: [phone]                 [facility]           Fax: [phone]                           Outpatient Physical Therapy                                                                            Daily Note    Patient: Hina Pollock : 1960  [facility] #: [id number]   Referring Physician: Shiv Tuttle MD  Date: 2024      Treatment Diagnosis: L knee pain    Onset Date: 10/03/24  PT Insurance Information: Aetna  Total # of Visits Approved: 38 Per Physician Order  Total # of Visits to Date: 26  No Show: 0  Canceled Appointment: 0    25 Plan of Care/Recert Due    Pre-Treatment Pain:  3-4/10  Subjective: Pt. reports 3-4/10 L knee pain prior to therapy session this date.    Exercises:  Exercise 3: Step stretches flex/ext 3x30\" (3 way HS stretch), slantboard stretch x60\"--step stretches today  Exercise 6: Seated L HS stretch with quad set [id number] //LAQ x15  Exercise 7: Sit/stands 2x10, holding 8# weight  Exercise 8: TKE with small red ball 5\" hold x 10-- with OTB today  Exercise 9: FSU 8\" x20, LSU 8\" x20, step downs 6\" x15--no step downs  Exercise 10: seated knee flexion stretch with sliding board 10x10\" hold  Exercise 13: Prone knee flexion strap stretch 3x30\"  Exercise 16: Walked 200ft with SC  Exercise 17: Heel slides 10x10\" hold // heel prop with 4# weight for 1'--heel slides only    Assessment  Assessment: Pt. was able to achieve 2-110* L knee ROM this date after stretching. Pt. with mild tightness/soreness of medial side of knee. Continued with therex to improve ROM with good carryover noted. Will continue to progress as tolerated.    Activity Tolerance  Activity Tolerance: Patient tolerated treatment well    Patient Education  Patient Education: continued stretch frequency at home  Pt verbalized/demonstrated good understanding:     [x] Yes         [] No, pt required further clarification.      Post Treatment Pain:  3-4/10      Plan  Plan Frequency: 7  Plan weeks: 2       Goals  (Total #

## 2024-12-09 ENCOUNTER — HOSPITAL ENCOUNTER (OUTPATIENT)
Dept: PHYSICAL THERAPY | Age: 64
Setting detail: THERAPIES SERIES
Discharge: HOME | End: 2024-12-09
Payer: COMMERCIAL

## 2024-12-09 PROCEDURE — G0283 ELEC STIM OTHER THAN WOUND: HCPCS

## 2024-12-09 PROCEDURE — 97110 THERAPEUTIC EXERCISES: CPT

## 2024-12-09 NOTE — FLOWSHEET NOTE
7  Plan weeks: 2       Goals  (Total # of Visits to Date: 27)      Short Term Goals  Time Frame for Short Term Goals: 3 weeks  Short Term Goal 1: Patient to initiate HEP for improved L knee ROM and strength.-met  Short Term Goal 2: Patient to have improved L knee ROM 5-90* for improved mobility.-met (3-104* on 11/13)  Short Term Goal 3: Patient to tolerate manual techniques/modalities to decrease L knee pain and edema and improve mobility.-met    Long Term Goals  Time Frame for Long Term Goals : 6 weeks  Long Term Goal 1: Patient to be independent and compliant with HEP.-progressing  Long Term Goal 2: Patient to have improved L knee ROM 0-120* for improved mobility.-progressing  Long Term Goal 3: Patient to have improved L LE strength >/=4+/5 grossly for improved stability.-progressing  Long Term Goal 4: Patient to be able to walk outdoors with no AD, no gait deviations and no increase in knee pain to return to PLOF.-progressing    Minutes Tracking:  Time In: 1030  Time Out: 1115  Minutes: 45       Jolanta Stiles PTA     Date: 12/9/2024

## 2024-12-10 ENCOUNTER — HOSPITAL ENCOUNTER (OUTPATIENT)
Dept: PHYSICAL THERAPY | Age: 64
Setting detail: THERAPIES SERIES
Discharge: HOME | End: 2024-12-10
Payer: COMMERCIAL

## 2024-12-10 PROCEDURE — G0283 ELEC STIM OTHER THAN WOUND: HCPCS

## 2024-12-10 PROCEDURE — 97110 THERAPEUTIC EXERCISES: CPT

## 2024-12-10 PROCEDURE — 97140 MANUAL THERAPY 1/> REGIONS: CPT

## 2024-12-10 NOTE — PROGRESS NOTES
Phone: [phone]                 [facility]           Fax: [phone]                           Outpatient Physical Therapy                                                                            Daily Note    Patient: Hina Pollock : 1960  [facility] #: [id number]   Referring Physician: Shiv Tuttle MD  Date: 12/10/2024    Diagnosis: L knee OA, M17.12  Treatment Diagnosis: L knee pain    Onset Date: 10/03/24  PT Insurance Information: Aetna  Total # of Visits Approved: 38 Per Physician Order  Total # of Visits to Date: 28  No Show: 0  Canceled Appointment: 0    25 Plan of Care/Recert Due    Pre-Treatment Pain:  4/10  Subjective: Pt states pain is 3-4/10.    Exercises:  Exercise 2: Scifit x 10 min 1.0 (focus on knee flex ROM going up one notch at a time throughout-104* knee flex achieved)  Exercise 3: Step stretches flex/ext 3x30\" (3 way HS stretch), slantboard stretch x60\"--step stretches today  Exercise 4: StarTrac flex 3 pl SL x15, ext 3pl, DL [id number]--etrp today for 25# overpressure to improve L knee flexion ROM  Exercise 7: Sit/stands 2x10, holding 8# weight, HS curl/march, HR and mini squat x20 ea  Exercise 8: TKE with plum TB 20x3\"  Exercise 9: FSU 8\" x20, LSU 8\" x20, step downs 8\"x20  Exercise 13: prone hang 3' 5# for extension    Manual:  Other: Passive stretching for flexion/Extension  in seated    Modality:     Modality Flow Sheet:   Performed (X) Tx Modality   x Electrical Stim:      Hot Pack:   x Cold Pack:         Assessment  Assessment: Pt states pain is 3-4/10. Pt completed functional strengthening and PROM to progress flexion and extension range. Flexion 110-112 supine, extension -2. Strength is 4/4+/5. IFC/cp following session for pain    Activity Tolerance  Activity Tolerance: Patient tolerated treatment well    Patient Education  Patient Education: Home stretching  Pt verbalized/demonstrated good understanding:     [x] Yes         [] No, pt required further clarification.

## 2024-12-11 ENCOUNTER — HOSPITAL ENCOUNTER (OUTPATIENT)
Dept: PHYSICAL THERAPY | Age: 64
Setting detail: THERAPIES SERIES
Discharge: HOME | End: 2024-12-11
Payer: COMMERCIAL

## 2024-12-11 PROCEDURE — 97110 THERAPEUTIC EXERCISES: CPT

## 2024-12-11 PROCEDURE — G0283 ELEC STIM OTHER THAN WOUND: HCPCS

## 2024-12-11 NOTE — FLOWSHEET NOTE
Phone: [phone]                 [facility]           Fax: [phone]                           Outpatient Physical Therapy                                                                            Daily Note    Patient: Hina Pollock : 1960  CSN #: [id number]   Referring Physician: Shiv Tuttle MD  Date: 2024    Diagnosis: L knee OA, M17.12  Treatment Diagnosis: L knee pain    Onset Date: 10/03/24  PT Insurance Information: Aetna  Total # of Visits Approved: 38 Per Physician Order  Total # of Visits to Date:   No Show: 0  Canceled Appointment: 0    25 Plan of Care/Recert Due    Pre-Treatment Pain:  0/10  Subjective: Pt reports no pain, arrives without cane with slightly antalgic gait. Main complaint is stiffness.    Exercises:  Exercise 2: Upright bike - U to full revolutions 5'  Exercise 3: Step stretches flex/ext 3x30\" (3 way HS stretch), slantboard stretch x60\"--step stretches today  Exercise 4: StarTrac flex 3 pl SL x15, ext 3pl x15  Exercise 7: Sit/stands 2x10, holding 8# weight, HS curl/march, HR and mini squat x20 ea, cone taps SL L LE x20  Exercise 8: TKE with plum TB 20x3\", heel tap x20 3\" step  Exercise 9: FSU 8\" x20, LSU 8\" x20, step downs 8\"x20  Exercise 15: 12in romie fwd/lat step over 2x10    Modality:          Modality Flow Sheet:   Performed (X) Tx Modality   x Electrical Stim:15' L knee      x Cold Pack: 15' L knee x2 one below one above             Assessment  Assessment: Continued to progress single leg stability and overall strength as well as ROM. Good tolerance, rest breaks prn d/t fatigue. IFC/CP post for soreness.    Activity Tolerance  Activity Tolerance: Patient tolerated treatment well    Patient Education  Patient Education: Home stretching  Pt verbalized/demonstrated good understanding:     [x] Yes         [] No, pt required further clarification.       Post Treatment Pain:  0/10      Plan  Plan Frequency: 7  Plan weeks: 2       Goals  (Total # of

## 2024-12-12 ENCOUNTER — HOSPITAL ENCOUNTER (OUTPATIENT)
Dept: PHYSICAL THERAPY | Age: 64
Setting detail: THERAPIES SERIES
Discharge: HOME | End: 2024-12-12
Payer: COMMERCIAL

## 2024-12-12 PROCEDURE — 97110 THERAPEUTIC EXERCISES: CPT

## 2024-12-12 PROCEDURE — G0283 ELEC STIM OTHER THAN WOUND: HCPCS

## 2024-12-12 PROCEDURE — 97140 MANUAL THERAPY 1/> REGIONS: CPT

## 2024-12-12 NOTE — PROGRESS NOTES
Phone: [phone]                 [facility]           Fax: [phone]                           Outpatient Physical Therapy                                                                            Daily Note    Patient: Hina Pollock : 1960  [facility] #: [id number]   Referring Physician: Shiv Tuttle MD  Date: 2024    Diagnosis: L knee OA, M17.12  Treatment Diagnosis: L knee pain    Onset Date: 10/03/24  PT Insurance Information: Aetna  Total # of Visits Approved: 38 Per Physician Order  Total # of Visits to Date: 30  No Show: 0  Canceled Appointment: 0    25 Plan of Care/Recert Due    Pre-Treatment Pain:  0/10  Subjective: Pt denies pain, states it's more stiffness    Exercises:  Exercise 1: HEP: heel prop, quad sets, heel slides, LAQ, seated heel slides  Exercise 2: Upright bike - U to full revolutions 5'/scifit 10 min  Exercise 3: Step stretches flex/ext 3x30\" (3 way HS stretch), slantboard stretch x60\"--step stretches today  Exercise 4: StarTrac flex 3 pl SL x15, ext 3pl x15  Exercise 5: Cal retro walk 15.5# x10, lat walk outs 10# x3 (only retro walk today)  Exercise 7: Sit/stands 2x10, holding 8# weight, HS curl/march, HR and mini squat x20 ea, cone taps SL L LE x20  Exercise 8: TKE with plum TB 20x3\", heel tap x20 3\" step  Exercise 9: FSU 8\" x20, LSU 8\" x20, step downs 8\"x20  Exercise 13: prone hang 3' 5# for extension/ extension board 3x 4min holds  Exercise 15: 12in romie fwd/lat step over 2x10    Manual:  Joint Mobilization: tib/femoral PA grade III/IV, knee flexion with joint gapping/overpressure  Other: Passive stretching for flexion/Extension  in supine    Modality:     Modality Flow Sheet:   Performed (X) Tx Modality   x Electrical Stim:      Hot Pack:   x Cold Pack:         Assessment  Assessment: Pain is mild. Added extension board to assist with increasing ROM. -4 deg extension following session. Mobs and stretching for flexion range. HEP reviewed. IFC/cp following

## 2024-12-13 ENCOUNTER — HOSPITAL ENCOUNTER (OUTPATIENT)
Dept: PHYSICAL THERAPY | Age: 64
Setting detail: THERAPIES SERIES
Discharge: HOME | End: 2024-12-13
Payer: COMMERCIAL

## 2024-12-13 PROCEDURE — G0283 ELEC STIM OTHER THAN WOUND: HCPCS

## 2024-12-13 PROCEDURE — 97110 THERAPEUTIC EXERCISES: CPT

## 2024-12-13 NOTE — PROGRESS NOTES
Phone: [phone]                 [facility]           Fax: [phone]                           Outpatient Physical Therapy                                                                            Daily Note    Patient: Hina Pollock : 1960  CSN #: [id number]   Referring Physician: Shiv Tuttle MD  Date: 2024    Diagnosis: L knee OA, M17.12  Treatment Diagnosis: L knee pain    Onset Date: 10/03/24  PT Insurance Information: Aetna  Total # of Visits Approved: 38 Per Physician Order  Total # of Visits to Date: 31  No Show: 0  Canceled Appointment: 0    25 Plan of Care/Recert Due    Pre-Treatment Pain:  4/10  Subjective: Patient reports 4/10 pain today; very painful last night after doing the knee ext board stretch.    Exercises:  Exercise 1: HEP: heel prop, quad sets, heel slides, LAQ, seated heel slides  Exercise 2: Upright bike - U to full revolutions 5'/scifit 10 min  Exercise 3: Step stretches flex/ext 3x30\" (3 way HS stretch), slantboard stretch x60\"--step stretches today  Exercise 11: Standing hip abd/ext, AROM x15  Exercise 13: prone hang 3' 5# for extension/ extension board 3x 4min holds--prone hang today with HP on HS's  Exercise 16: Seated L HS stretch with quad sets [id number] holds; TKE with red ball behind knee x10  Exercise 17: Heel slides 10x10\" hold // heel prop with 4# weight for 1'--heel slides only    Modality: IFC and CP to L knee x15 min to decrease pain/edema    Assessment  Assessment: Focused mostly on L knee extension today and able to achieve -2* during prone knee ext over EOB. Pt with 110* L knee flexion today. Will continue.    Activity Tolerance  Activity Tolerance: Patient tolerated treatment well    Patient Education  Exercise technique   Pt verbalized/demonstrated good understanding:     [x] Yes         [] No, pt required further clarification.       Post Treatment Pain:  2/10      Plan  Plan Frequency: 7  Plan weeks: 2       Goals  (Total # of Visits to

## 2024-12-14 ENCOUNTER — HOSPITAL ENCOUNTER (OUTPATIENT)
Dept: PHYSICAL THERAPY | Age: 64
Setting detail: THERAPIES SERIES
Discharge: HOME | End: 2024-12-14
Payer: COMMERCIAL

## 2024-12-14 PROCEDURE — 97110 THERAPEUTIC EXERCISES: CPT

## 2024-12-14 NOTE — PROGRESS NOTES
Phone: [phone]                 [facility]           Fax: [phone]                           Outpatient Physical Therapy                                                                            Daily Note    Patient: Hina Pollock : 1960  [facility] #: [id number]   Referring Physician: Shiv Tuttle MD  Date: 2024      Treatment Diagnosis: L knee pain    Onset Date: 10/03/24  PT Insurance Information: Aetna  Total # of Visits Approved: 38 Per Physician Order  Total # of Visits to Date: 32  No Show: 0  Canceled Appointment: 0    25 Plan of Care/Recert Due    Pre-Treatment Pain:  1-2/10  Subjective: Pt. reports 1-2/10 pain today, states that it feels much better since the past couple days.    Exercises:  Exercise 1: HEP: heel prop, quad sets, heel slides, LAQ, seated heel slides  Exercise 3: Step stretches flex/ext 3x30\" (3 way HS stretch), slantboard stretch x60\"--step stretches today  Exercise 7: Sit/stands 2x10, holding 8# weight, HS curl/march, HR and mini squat x20 ea, cone taps SL L LE x20--no cone taps or STS today  Exercise 8: TKE with plum TB 20x3\", heel tap x20 3\" step--TKE today  Exercise 9: FSU 8\" x20, LSU 8\" x20, step downs 8\"x20  Exercise 11: Standing hip abd/ext, AROM x15  Exercise 13: prone hang 3' 5# for extension/ extension board 3x 4min holds--prone hang today x2'  Exercise 17: Heel slides 10x10\" hold // heel prop with 4# weight for 1'--heel slides only  Exercise 18: ambulation without AD 200ft    Assessment  Assessment: Continued to focus on L knee extension and flexion this date. Pt. with 105* of L knee flexion this date. Educated pt. on using ice/heat at home to decrease swelling and/or pain. Will continue to progress.    Activity Tolerance  Activity Tolerance: Patient tolerated treatment well    Patient Education  Patient Education: HEP  Pt verbalized/demonstrated good understanding:     [x] Yes         [] No, pt required further clarification.      Post Treatment

## 2024-12-16 ENCOUNTER — HOSPITAL ENCOUNTER (OUTPATIENT)
Dept: PHYSICAL THERAPY | Age: 64
Setting detail: THERAPIES SERIES
Discharge: HOME | End: 2024-12-16
Payer: COMMERCIAL

## 2024-12-16 PROCEDURE — G0283 ELEC STIM OTHER THAN WOUND: HCPCS

## 2024-12-16 PROCEDURE — 97110 THERAPEUTIC EXERCISES: CPT

## 2024-12-16 NOTE — PROGRESS NOTES
Phone: [phone]                 [facility]           Fax: [phone]                           Outpatient Physical Therapy                                                                            Daily Note    Patient: Hina Pollock : 1960  [facility] #: [id number]   Referring Physician: Shiv Tuttle MD  Date: 2024    Diagnosis: L knee OA, M17.12  Treatment Diagnosis: L knee pain    Onset Date: 10/03/24  PT Insurance Information: Aetna  Total # of Visits Approved: 38 Per Physician Order  Total # of Visits to Date: 33  No Show: 0  Canceled Appointment: 0    25 Plan of Care/Recert Due    Pre-Treatment Pain:  2/10  Subjective: Pain 2/10, states it was a little higher this weekend    Exercises:  Exercise 2: Upright bike - U to full revolutions 5'/scifit 10 min  Exercise 3: Step stretches flex/ext 3x30\" (3 way HS stretch), slantboard stretch x60\"--step stretches today  Exercise 4: StarTrac flex 3 pl SL x15, ext 3pl x15  Exercise 6: Seated L HS stretch with quad set [id number] / seated 5# knee extension stretch /LAQ x15  Exercise 7: Sit/stands 2x10, holding 8# weight, HS curl/march, HR and mini squat x20 ea, cone taps SL L LE x20--no cone taps or STS today  Exercise 8: TKE with plum TB 20x3\", heel tap x20 3\" step--TKE today  Exercise 9: FSU 8\" x20, LSU 8\" x20, step downs 8\"x20  Exercise 10: .  Exercise 11: .  Exercise 12: Prone TKE 5 sec holdx10; prone knee ext stretch with HP on hamstrings x4min 2#  Exercise 13: extension board 3x 4min holds-       Modality:     Modality Flow Sheet:   Performed (X) Tx Modality   x Electrical Stim:      Hot Pack:   x Cold Pack:         Assessment  Assessment: Pt knee flexion remains 105 deg passive. Strengthening and static stretching for flexion and extension. Passive extension -3 to -2 deg following session.    Activity Tolerance  Activity Tolerance: Patient tolerated treatment well    Patient Education  Patient Education: HEP  Pt verbalized/demonstrated

## 2024-12-18 ENCOUNTER — HOSPITAL ENCOUNTER (OUTPATIENT)
Dept: PHYSICAL THERAPY | Age: 64
Setting detail: THERAPIES SERIES
Discharge: HOME | End: 2024-12-18
Payer: COMMERCIAL

## 2024-12-18 PROCEDURE — 97110 THERAPEUTIC EXERCISES: CPT

## 2024-12-18 PROCEDURE — 97016 VASOPNEUMATIC DEVICE THERAPY: CPT

## 2024-12-18 PROCEDURE — 97140 MANUAL THERAPY 1/> REGIONS: CPT

## 2024-12-18 NOTE — PROGRESS NOTES
Phone: [phone]                 [facility]           Fax: [phone]                           Outpatient Physical Therapy                                                                            Daily Note    Patient: Hina Pollock : 1960  CSN #: [id number]   Referring Physician: Shiv Tuttle MD  Date: 2024    Diagnosis: L knee OA, M17.12  Treatment Diagnosis: L knee pain    Onset Date: 10/03/24  PT Insurance Information: Aetna  Total # of Visits Approved: 38 Per Physician Order  Total # of Visits to Date: 34  No Show: 0  Canceled Appointment: 0    25 Plan of Care/Recert Due    Pre-Treatment Pain:  2/10  Subjective: Pt reports L knee is doing ok today, 1-2/10 pain.    Exercises:  Exercise 2: Sci Fit level 5 x 10 min, seat as close as tolerable  Exercise 3: Step stretches flex/ext 2x60\"  Exercise 4: total gym squat holds 5 x 40\"    20\" rest  Exercise 5: modified lunges at steps x 15 L // forward lunges 2 x 10 L forward    Manual:  Joint Mobilization: tib/femoral PA grade III/IV, knee flexion with joint gapping/overpressure  Other: Passive stretching for flexion/Extension  in supine    Modality:     Modality Flow Sheet:   Performed (X) Tx Modality   x Game Ready: mod pressure x 10 min L knee           Assessment  Assessment: Pt knee at 110 flexion and -10 ext at end of session. Added modified lunges and forward lunges for knee flex in weight bearing, added total gym squat holds at end range L knee flexion as well. Pt requires min verbal cueing for correct limb position and weight distibution for lunges. Will continue to progress.    Activity Tolerance  Activity Tolerance: Patient tolerated treatment well    Patient Education  Patient Education: HEP  Pt verbalized/demonstrated good understanding:     [x] Yes         [] No, pt required further clarification.       Post Treatment Pain:  2/10      Plan  Plan Frequency: 7  Plan weeks: 2       Goals  (Total # of Visits to Date: 34)

## 2024-12-19 ENCOUNTER — HOSPITAL ENCOUNTER (OUTPATIENT)
Dept: PHYSICAL THERAPY | Age: 64
Setting detail: THERAPIES SERIES
Discharge: HOME | End: 2024-12-19
Payer: COMMERCIAL

## 2024-12-19 PROCEDURE — 97110 THERAPEUTIC EXERCISES: CPT

## 2024-12-19 PROCEDURE — 97016 VASOPNEUMATIC DEVICE THERAPY: CPT

## 2024-12-19 NOTE — PROGRESS NOTES
Phone: [phone]                 [facility]           Fax: [phone]                           Outpatient Physical Therapy                                                                            Daily Note    Patient: Hina Pollock : 1960  CSN #: [id number]   Referring Physician: Shiv Tuttle MD  Date: 2024       Treatment Diagnosis: L knee pain    Onset Date: 10/03/24  PT Insurance Information: A[employer]  Total # of Visits Approved: 38 Per Physician Order  Total # of Visits to Date: 35  No Show: 0  Canceled Appointment: 0    25 Plan of Care/Recert Due    Pre-Treatment Pain:  0/10  Subjective: Patient denies pain coming into therapy today.  She reports stiffness.    Exercises:  Exercise 2: Sci Fit level 5 x 7 min, seat as close as tolerable, Airdyne bike seat 8, x5 minutes  Exercise 3: Step stretches flex/ext 2x60\"  Exercise 8: TKE with plum TB 20x3\"  Exercise 9: FSU 8\" x20, LSU 8\" x20, step downs 8\"x20  Exercise 13: extension board 3x 4min holds-      Modality:     Modality Flow Sheet:   Performed (X) Tx Modality   X Game Ready: mod pressure x 10 min L knee          Assessment  Assessment: Continued with ROM exercises and was able to use the upright bike today.  Used the extension board to improve knee extension ROM.  Used the game ready post tx session to decrease knee pain.    Activity Tolerance  Activity Tolerance: Patient tolerated treatment well    Patient Education  Patient Education: HEP  Pt verbalized/demonstrated good understanding:     [x] Yes         [] No, pt required further clarification.       Post Treatment Pain:  0/10      Plan  Plan Frequency: 7  Plan weeks: 2       Goals  (Total # of Visits to Date: 35)      Short Term Goals  Time Frame for Short Term Goals: 3 weeks  Short Term Goal 1: Patient to initiate HEP for improved L knee ROM and strength.-met  Short Term Goal 2: Patient to have improved L knee ROM 5-90* for improved mobility.-met (3-104* on )  Short

## 2024-12-24 ENCOUNTER — HOSPITAL ENCOUNTER (OUTPATIENT)
Dept: PHYSICAL THERAPY | Age: 64
Setting detail: THERAPIES SERIES
Discharge: HOME | End: 2024-12-24
Payer: COMMERCIAL

## 2024-12-24 PROCEDURE — 97110 THERAPEUTIC EXERCISES: CPT

## 2024-12-24 PROCEDURE — 97016 VASOPNEUMATIC DEVICE THERAPY: CPT

## 2024-12-24 PROCEDURE — 97140 MANUAL THERAPY 1/> REGIONS: CPT

## 2024-12-24 NOTE — PROGRESS NOTES
Phone: [phone]                 [facility]           Fax: [phone]                           Outpatient Physical Therapy                                                                            Daily Note    Patient: Hina Pollock : 1960  [facility] #: [id number]   Referring Physician: Shiv Tuttle MD  Date: 2024     Treatment Diagnosis: L knee pain    Onset Date: 10/03/24  PT Insurance Information: Aetna  Total # of Visits Approved: 38 Per Physician Order  Total # of Visits to Date: 36  No Show: 0  Canceled Appointment: 0    25 Plan of Care/Recert Due    Pre-Treatment Pain:  0/10  Subjective: Pt reports overall stiffness but no current pain.    Exercises:  Exercise 1: HEP: heel prop, quad sets, heel slides, LAQ, seated heel slides  Exercise 2: Sci Fit level 5 x 7 min, seat as close as tolerable, Airdyne bike seat 8, x5 minutes  Exercise 7: Sit/stands 2x10, holding 8# weight, HS curl/march, HR and mini squat x20 ea, cone taps SL L LE x20--no cone taps or STS today  Exercise 9: FSU 8\" x20, LSU 8\" x20, step downs 8\"x20    Manual:  Joint Mobilization: tib/femoral PA grade III/IV, knee flexion with joint gapping/overpressure  Other: Passive stretching for flexion/Extension  in supine    Modality:   Modality Flow Sheet:   Performed (X) Tx Modality   x Gameready medium compression 15 mins       Assessment  Assessment: Focused on ROM today with restrictions noted with extension and flexion.  Flexion ROM today 105*, ext lacking 5 after manual.  Will continue.    Activity Tolerance  Activity Tolerance: Patient tolerated treatment well    Patient Education  Patient Education: HEP  Pt verbalized/demonstrated good understanding:     [x] Yes         [] No, pt required further clarification.     Post Treatment Pain:  0/10    Plan  Plan Frequency: 7  Plan weeks: 2     Goals  (Total # of Visits to Date: 36)      Short Term Goals  Time Frame for Short Term Goals: 3 weeks  Short Term Goal 1: Patient

## 2024-12-26 ENCOUNTER — HOSPITAL ENCOUNTER (OUTPATIENT)
Dept: PHYSICAL THERAPY | Age: 64
Setting detail: THERAPIES SERIES
Discharge: HOME | End: 2024-12-26
Payer: COMMERCIAL

## 2024-12-26 PROCEDURE — 97110 THERAPEUTIC EXERCISES: CPT

## 2024-12-26 PROCEDURE — 97140 MANUAL THERAPY 1/> REGIONS: CPT

## 2024-12-26 PROCEDURE — 97016 VASOPNEUMATIC DEVICE THERAPY: CPT

## 2024-12-26 NOTE — PROGRESS NOTES
1/10      Plan  Plan Frequency: 3  Plan weeks: 2       Goals  (Total # of Visits to Date: 37)      Short Term Goals  Time Frame for Short Term Goals: 3 weeks  Short Term Goal 1: Patient to initiate HEP for improved L knee ROM and strength.-met  Short Term Goal 2: Patient to have improved L knee ROM 5-90* for improved mobility.-met (3-104* on 11/13)  Short Term Goal 3: Patient to tolerate manual techniques/modalities to decrease L knee pain and edema and improve mobility.-met    Long Term Goals  Time Frame for Long Term Goals : 6 weeks  Long Term Goal 1: Patient to be independent and compliant with HEP.-progressing  Long Term Goal 2: Patient to have improved L knee ROM 0-120* for improved mobility.-progressing  Long Term Goal 3: Patient to have improved L LE strength >/=4+/5 grossly for improved stability.-progressing  Long Term Goal 4: Patient to be able to walk outdoors with no AD, no gait deviations and no increase in knee pain to return to PLOF.-progressing    Minutes Tracking:  Time In: 1612  Time Out: 1710  Minutes: 58  Timed Code Treatment Minutes: 56 Minutes      Shiv Jensen, PT, DPT, OCS, Cert. DN      Date: 12/26/2024

## 2024-12-26 NOTE — PLAN OF CARE
[facility]           Phone: [phone]             Outpatient Physical Therapy  Fax: [phone]                                           Date: 2024  Patient: Hina Pollock : 1960 CSN #: [id number]   Referring Physician: Shiv Tuttle MD      [] Plan of Care   [x] Updated Plan of Care    Dates of Service to Include: 2024 to 25    Diagnosis:        Rehab (Treatment) Diagnosis:  L knee pain         Onset Date:  10/03/24    Attendance  Total # of Visits to Date: 37 No Show: 0 Canceled Appointment: 0    Assessment  Assessment: Patient with improved knee extension at heel strike walking into therapy today.  Continued with manual interventions and exercises to work toward her strength and ROM goals.  Supine ROM measured -3-107* in supine today following her tx session.  She would benefit from continued PT to work toward her long term goals.      Goals  Short Term Goals  Time Frame for Short Term Goals: 3 weeks  Short Term Goal 1: Patient to initiate HEP for improved L knee ROM and strength.-met  Short Term Goal 2: Patient to have improved L knee ROM 5-90* for improved mobility.-met (3-104* on )  Short Term Goal 3: Patient to tolerate manual techniques/modalities to decrease L knee pain and edema and improve mobility.-met  Long Term Goals  Time Frame for Long Term Goals : 6 weeks  Long Term Goal 1: Patient to be independent and compliant with HEP.-progressing  Long Term Goal 2: Patient to have improved L knee ROM 0-120* for improved mobility.-progressing  Long Term Goal 3: Patient to have improved L LE strength >/=4+/5 grossly for improved stability.-progressing  Long Term Goal 4: Patient to be able to walk outdoors with no AD, no gait deviations and no increase in knee pain to return to PLOF.-progressing     Prognosis  Therapy Prognosis: Good    Treatment Plan   Plan Frequency: 3  Plan weeks:

## 2024-12-27 ENCOUNTER — HOSPITAL ENCOUNTER (OUTPATIENT)
Dept: PHYSICAL THERAPY | Age: 64
Setting detail: THERAPIES SERIES
Discharge: HOME | End: 2024-12-27
Payer: COMMERCIAL

## 2024-12-27 PROCEDURE — 97140 MANUAL THERAPY 1/> REGIONS: CPT

## 2024-12-27 PROCEDURE — 97110 THERAPEUTIC EXERCISES: CPT

## 2024-12-27 PROCEDURE — 97016 VASOPNEUMATIC DEVICE THERAPY: CPT

## 2024-12-27 NOTE — PROGRESS NOTES
Physical Therapy  Phone: [phone]                 [facility]           Fax: [phone]                           Outpatient Physical Therapy                                                                            Daily Note    Patient: Hina Pollock : 1960  CSN #: [id number]   Referring Physician: Shiv Tuttle MD  Date: 2024     Treatment Diagnosis: L knee pain    Onset Date: 10/03/24  PT Insurance Information: Aetna  Total # of Visits Approved: 43 Per Physician Order  Total # of Visits to Date: 38  No Show: 0  Canceled Appointment: 0    25 Plan of Care/Recert Due    Pre-Treatment Pain:  0/10  Subjective: Pt notes she has just stiffness. Pt states she has been completing HEP daily with no concerns. Pt states if she wears the wrong shoes she has increased pain after a few hours. Pt notes she continues to have pain with knee flexion end range. Pt reports she is able to complete recirpcal stair navigation.    Exercises:  Exercise 2: Sci Fit level 5 x 6 min, seat as close as tolerable  Exercise 4: total gym squat holds 5 sec hold x15, TG SL squat 2x10 focus on TKE, knee flexion ROM, TG SL HR x  Exercise 9: FSU \"6 x15, LSU 6\" x15, step downs 6\"x15 with eccentric control  Exercise 11: lunge to bosu ball x15 B LE    Manual:  Other: Passive stretching for flexion/Extension  in supine    Modality:   Modality Flow Sheet:   Performed (X) Tx Modality   X Game Ready: mod pressure x 10 min L knee      Assessment  Assessment: Progressed TG with addition of SL exer for increasing quad activation and focusing on knee ext ROM. Performed eccentric focus on step ups for increased quad activation, pt required use of UE support to maintain balance. Cues given wtih lunges to bosu for maintaining slighment of posterior LE to avoid varus/valgus. Pt knee L flexion AROM 108 deg, L knee ext AROM lacking 3 deg from neutral.    Activity Tolerance  Activity Tolerance: Patient tolerated treatment

## 2024-12-31 ENCOUNTER — HOSPITAL ENCOUNTER (OUTPATIENT)
Dept: PHYSICAL THERAPY | Age: 64
Setting detail: THERAPIES SERIES
Discharge: HOME | End: 2024-12-31
Payer: COMMERCIAL

## 2024-12-31 PROCEDURE — 97140 MANUAL THERAPY 1/> REGIONS: CPT

## 2024-12-31 PROCEDURE — 97016 VASOPNEUMATIC DEVICE THERAPY: CPT

## 2024-12-31 PROCEDURE — 97110 THERAPEUTIC EXERCISES: CPT

## 2025-01-02 ENCOUNTER — HOSPITAL ENCOUNTER (OUTPATIENT)
Dept: PHYSICAL THERAPY | Age: 65
Setting detail: THERAPIES SERIES
Discharge: HOME | End: 2025-01-02
Payer: COMMERCIAL

## 2025-01-02 PROCEDURE — 97110 THERAPEUTIC EXERCISES: CPT

## 2025-01-02 PROCEDURE — 97016 VASOPNEUMATIC DEVICE THERAPY: CPT

## 2025-01-02 NOTE — PROGRESS NOTES
clarification.     Post Treatment Pain:  2/10    Plan  Plan Frequency: 3  Plan weeks: 2     Goals  (Total # of Visits to Date: 39)      Short Term Goals  Time Frame for Short Term Goals: 3 weeks  Short Term Goal 1: Patient to initiate HEP for improved L knee ROM and strength.-met  Short Term Goal 2: Patient to have improved L knee ROM 5-90* for improved mobility.-met (3-104* on 11/13)  Short Term Goal 3: Patient to tolerate manual techniques/modalities to decrease L knee pain and edema and improve mobility.-met    Long Term Goals  Time Frame for Long Term Goals : 6 weeks  Long Term Goal 1: Patient to be independent and compliant with HEP.-progressing  Long Term Goal 2: Patient to have improved L knee ROM 0-120* for improved mobility.-progressing  Long Term Goal 3: Patient to have improved L LE strength >/=4+/5 grossly for improved stability.-progressing  Long Term Goal 4: Patient to be able to walk outdoors with no AD, no gait deviations and no increase in knee pain to return to PLOF.-progressing    Minutes Tracking:  Time In: 1001  Time Out: 1100  Minutes: 59  Timed Code Treatment Minutes: 57 Minutes    Tripp Tompkins, MIGUEL ANGEL     Date: 12/31/2024

## 2025-01-02 NOTE — PROGRESS NOTES
Phone: [phone]                 [facility]           Fax: [phone]                           Outpatient Physical Therapy                                                                            Daily Note    Patient: Hina Pollock : 1960  [facility] #: [id number]   Referring Physician: Shiv Tuttle MD  Date: 2025       Treatment Diagnosis: L knee pain    Onset Date: 10/03/24  PT Insurance Information: Aetna  Total # of Visits Approved: 43 Per Physician Order  Total # of Visits to Date: 40  No Show: 0  Canceled Appointment: 0    25 Plan of Care/Recert Due    Pre-Treatment Pain:  1/10  Subjective: Patient reports feeling better today than yesterday. Pain 1/10 in L knee prior to session this date.    Exercises:  Exercise 1: HEP: heel prop, quad sets, heel slides, LAQ, seated heel slides  Exercise 2: Sci Fit level 5 x 6 min, seat as close as tolerable  Exercise 3: Step stretches flex/ext 2x60\"  Exercise 4: total gym squat holds 5 sec hold x15, TG SL squat 2x10 focus on TKE, knee flexion ROM, TG SL HR 2 x 10  Exercise 13: extension board 3x 4min holds-  Exercise 14: Cybex leg press 2x10, L SL, L HR, 3 plates  Exercise 15: 12in romie fwd/lat step over 2x10  Exercise 19: -hospital stairway x3    Modality: Game ready (vasopneumatic device) mod compression, 36*, 15 minutes to reduce edema and discomfort post session.     Assessment  Assessment: Verbal cues provided with recip steps to reduce hip hike compensation and increase L knee flexion with good carryover. Pt occ displays ankle inversion with romie step overs with cues to reduce compensation. Pt AROM 3-112*. Will continue.    Activity Tolerance  Activity Tolerance: Patient tolerated treatment well    Patient Education  Patient Education: Continue working on fluid gait cycle.  Pt verbalized/demonstrated good understanding:     [x] Yes         [] No, pt required further clarification.       Post Treatment Pain:  1/10      Plan  Plan

## 2025-01-03 ENCOUNTER — HOSPITAL ENCOUNTER (OUTPATIENT)
Dept: PHYSICAL THERAPY | Age: 65
Setting detail: THERAPIES SERIES
Discharge: HOME | End: 2025-01-03
Payer: COMMERCIAL

## 2025-01-03 PROCEDURE — 97110 THERAPEUTIC EXERCISES: CPT

## 2025-01-03 PROCEDURE — 97016 VASOPNEUMATIC DEVICE THERAPY: CPT

## 2025-01-03 NOTE — PROGRESS NOTES
Phone: [phone]                 [facility]           Fax: [phone]                           Outpatient Physical Therapy                                                                            Daily Note    Patient: Hina Pollock : 1960  CSN #: [id number]   Referring Physician: Shiv Tuttle MD  Date: 1/3/2025    Diagnosis: L knee OA, M17.12  Treatment Diagnosis: L knee pain    Onset Date: 10/03/24  PT Insurance Information: Aetna  Total # of Visits Approved: 50 Per Physician Order  Total # of Visits to Date: 41  No Show: 0  Canceled Appointment: 0    25 Plan of Care/Recert Due    Pre-Treatment Pain:  0/10  Subjective: Patient with no complaints today.    Exercises:  Exercise 1: HEP: heel prop, quad sets, heel slides, LAQ, seated heel slides  Exercise 2: Sci Fit level 5 x 6 min, seat as close as tolerable--10 min today, L2.0  Exercise 3: Step stretches flex/ext 2x60\"  Exercise 4: total gym squat holds 5 sec hold x15, TG SL squat 2x10 focus on TKE, knee flexion ROM, TG SL HR 2 x 10  Exercise 9: FSU \"6 x15, LSU 6\" x15, step downs 6\"x15 with eccentric control  Exercise 13: extension board 3x 4min holds-  Exercise 14: Cybex leg press 2x10, L SL, L HR, 3 plates  Exercise 15: 12in romie fwd/lat step over 2x10    Modality: Game Ready vasopneumatic compression, moderate setting, 36*, to reduce edema and soreness    Assessment  Assessment: Patient has attended 41 PT visits for L knee pain and met goals for independence with HEP and improved L LE strength and is making steady progress toward goals for improved L knee ROM and normalized gait but continues to lack L TKE with gait. Patient with improved L knee flexion to 116* this date. Patient to benefit from continued PT 2x/wk for up to 4 more weeks to meet remaining goals and return to PLOF.    Activity Tolerance  Activity Tolerance: Patient tolerated treatment well    Patient Education  Exercise technique and progression   Pt

## 2025-01-03 NOTE — PLAN OF CARE
[facility]           Phone: [phone]             Outpatient Physical Therapy  Fax: [phone]                                           Date: 1/3/2025  Patient: Hina Pollock : 1960 CSN #: [id number]   Referring Physician: Shiv Tuttle MD      [] Plan of Care   [x] Updated Plan of Care    Dates of Service to Include: 1/3/2025 to 25    Diagnosis:  L knee OA, M17.12     Rehab (Treatment) Diagnosis:  L knee pain         Onset Date:  10/03/24    Attendance  Total # of Visits to Date: 41 No Show: 0 Canceled Appointment: 0    Assessment  Assessment: Patient has attended 41 PT visits for L knee pain and met goals for independence with HEP and improved L LE strength and is making steady progress toward goals for improved L knee ROM and normalized gait but continues to lack L TKE with gait. Patient with improved L knee flexion to 116* this date. Patient to benefit from continued PT 2x/wk for up to 4 more weeks to meet remaining goals and return to PLOF.      Goals  Short Term Goals  Time Frame for Short Term Goals: 3 weeks  Short Term Goal 1: Patient to initiate HEP for improved L knee ROM and strength.-met  Short Term Goal 2: Patient to have improved L knee ROM 5-90* for improved mobility.-met (3-112* on )  Short Term Goal 3: Patient to tolerate manual techniques/modalities to decrease L knee pain and edema and improve mobility.-met  Long Term Goals  Time Frame for Long Term Goals : 6 weeks  Long Term Goal 1: Patient to be independent and compliant with HEP.-progressing  Long Term Goal 2: Patient to have improved L knee ROM 0-120* for improved mobility.-progressing  Long Term Goal 3: Patient to have improved L LE strength >/=4+/5 grossly for improved stability.-progressing  Long Term Goal 4: Patient to be able to walk outdoors with no AD, no gait deviations and no increase in knee pain to return to

## 2025-01-07 ENCOUNTER — HOSPITAL ENCOUNTER (OUTPATIENT)
Dept: PHYSICAL THERAPY | Age: 65
Setting detail: THERAPIES SERIES
Discharge: HOME | End: 2025-01-07
Payer: COMMERCIAL

## 2025-01-07 PROCEDURE — 97110 THERAPEUTIC EXERCISES: CPT

## 2025-01-07 PROCEDURE — 97016 VASOPNEUMATIC DEVICE THERAPY: CPT

## 2025-01-07 NOTE — PROGRESS NOTES
Phone: [phone]                 [facility]           Fax: [phone]                           Outpatient Physical Therapy                                                                            Daily Note    Patient: Hina Pollock : 1960  CSN #: [id number]   Referring Physician: Shiv Tuttle MD  Date: 2025    Diagnosis: L knee OA, M17.12  Treatment Diagnosis: L knee pain    Onset Date: 10/03/24  PT Insurance Information: Aetna  Total # of Visits Approved: 50 Per Physician Order  Total # of Visits to Date: 42  No Show: 0  Canceled Appointment: 0    25 Plan of Care/Recert Due    Pre-Treatment Pain:  3/10  Subjective: Pt states her knee just feels a little achey    Exercises:  Exercise 2: Sci Fit level 5 x 6 min, seat as close as tolerable--10 min today, L2.0  Exercise 9: FSU/LSU/SD 8\" 20x  Exercise 14: Cybex leg press 2x10, L SL, L HR, 3 plates    Manual:  Other: Passive stretching for flexion/Extension  in supine    Modality:     Game ready 15 min         Assessment  Assessment: Pt states pain is mild. Pt can feel extension ROM is improving. Focused on functional strength and PTOM this date. Extension -3 to -5 deg. passive. Game ready following session    Activity Tolerance  Activity Tolerance: Patient tolerated treatment well    Patient Education  Patient Education: Home stretching  Pt verbalized/demonstrated good understanding:     [x] Yes         [] No, pt required further clarification.       Post Treatment Pain:  /10      Plan  Plan Frequency: 2  Plan weeks: 4       Goals  (Total # of Visits to Date: 42)      Short Term Goals  Time Frame for Short Term Goals: 3 weeks  Short Term Goal 1: Patient to initiate HEP for improved L knee ROM and strength.-met  Short Term Goal 2: Patient to have improved L knee ROM 5-90* for improved mobility.-met (3-112* on )  Short Term Goal 3: Patient to tolerate manual techniques/modalities to decrease L knee pain and edema and improve

## 2025-01-09 ENCOUNTER — HOSPITAL ENCOUNTER (OUTPATIENT)
Dept: PHYSICAL THERAPY | Age: 65
Setting detail: THERAPIES SERIES
Discharge: HOME | End: 2025-01-09
Payer: COMMERCIAL

## 2025-01-09 PROCEDURE — 97140 MANUAL THERAPY 1/> REGIONS: CPT

## 2025-01-09 PROCEDURE — 97110 THERAPEUTIC EXERCISES: CPT

## 2025-01-09 NOTE — PROGRESS NOTES
Phone: [phone]                 [facility]           Fax: [phone]                           Outpatient Physical Therapy                                                                            Daily Note    Patient: Hina Pollock : 1960  [facility] #: [id number]   Referring Physician: Shiv Tuttle MD  Date: 2025    Treatment Diagnosis: L knee pain    Onset Date: 10/03/24  PT Insurance Information: Aetna  Total # of Visits Approved: 50 Per Physician Order  Total # of Visits to Date: 43  No Show: 0  Canceled Appointment: 0    25 Plan of Care/Recert Due    Pre-Treatment Pain:  0/10  Subjective: Pt. states that she is doing good and no complaints of pain.    Exercises:  Exercise 2: Sci Fit level 5 x 6 min, seat as close as tolerable--10 min today, L2.0  Exercise 3: Step stretches flex/ext 2x60\"  Exercise 9: FSU/LSU/SD 8\" 20x  Exercise 14: Cybex leg press 2x10, L SL, L HR, 3 plates    Manual:  Other: Passive stretching for flexion/Extension  in supine    Modality: Game ready (vasopneumatic device) mod compression, 36*, 15 minutes to reduce edema and discomfort post session.     Assessment  Assessment: Continued to focus on ROM this date with good carryover noted. Pt. focuses on extension/flexion with performance of leg press. Ended therapy session with PROM and game ready x15 minutes. Will continue to progress.    Activity Tolerance  Activity Tolerance: Patient tolerated treatment well    Patient Education  Patient Education: Home stretching  Pt verbalized/demonstrated good understanding:     [x] Yes         [] No, pt required further clarification.      Post Treatment Pain:  0/10      Plan  Plan Frequency: 2  Plan weeks: 4       Goals  (Total # of Visits to Date: 43)      Short Term Goals  Time Frame for Short Term Goals: 3 weeks  Short Term Goal 1: Patient to initiate HEP for improved L knee ROM and strength.-met  Short Term Goal 2: Patient to have improved L knee ROM 5-90* for improved

## 2025-01-14 ENCOUNTER — HOSPITAL ENCOUNTER (OUTPATIENT)
Dept: PHYSICAL THERAPY | Age: 65
Setting detail: THERAPIES SERIES
Discharge: HOME | End: 2025-01-14
Payer: COMMERCIAL

## 2025-01-14 PROCEDURE — 97110 THERAPEUTIC EXERCISES: CPT

## 2025-01-14 PROCEDURE — 97016 VASOPNEUMATIC DEVICE THERAPY: CPT

## 2025-01-14 PROCEDURE — 97140 MANUAL THERAPY 1/> REGIONS: CPT

## 2025-01-14 NOTE — PROGRESS NOTES
Phone: [phone]                 [facility]           Fax: [phone]                           Outpatient Physical Therapy                                                                            Daily Note    Patient: Hina Pollock : 1960  [facility] #: [id number]   Referring Physician: Shiv Tuttle MD  Date: 2025       Treatment Diagnosis: L knee pain    Onset Date: 10/03/24  PT Insurance Information: Aetna  Total # of Visits Approved: 50 Per Physician Order  Total # of Visits to Date: 44  No Show: 0  Canceled Appointment: 0    25 Plan of Care/Recert Due    Pre-Treatment Pain:  1/10  Subjective: Pt reports achiness in L knee following driving a manual vehicle over the weekend.    Exercises:  Exercise 1: HEP: heel prop, quad sets, heel slides, LAQ, seated heel slides  Exercise 2: Sci Fit level 5 x 6 min, seat as close as tolerable--10 min today, L2.0  Exercise 3: Step stretches flex/ext 2x60\"  Exercise 9: FSU/LSU/SD 8\" 20x  Exercise 14: Cybex leg press 2x10, L SL, L HR, 3 plates    Manual:  Joint Mobilization: tib/femoral PA grade III/IV, knee flexion with joint gapping/overpressure  Other: extension with overpressure    Modality: Game ready, mod compression, 36*, 15 minutes to reduce edema and discomfort.     Assessment  Assessment: Continued with charted exercises with focus on TKE this date. PROM and extension with overpressure completed per tolerance. Game ready applied to reduce edema and discomfort. Continue per tolerance    Activity Tolerance  Activity Tolerance: Patient tolerated treatment well    Patient Education  Patient Education: Home stretching  Pt verbalized/demonstrated good understanding:     [x] Yes         [] No, pt required further clarification.       Post Treatment Pain:  1/10      Plan  Plan Frequency: 2  Plan weeks: 4       Goals  (Total # of Visits to Date: 44)      Short Term Goals  Time Frame for Short Term Goals: 3 weeks  Short Term Goal 1: Patient to

## 2025-01-16 ENCOUNTER — HOSPITAL ENCOUNTER (OUTPATIENT)
Dept: PHYSICAL THERAPY | Age: 65
Setting detail: THERAPIES SERIES
Discharge: HOME | End: 2025-01-16
Payer: COMMERCIAL

## 2025-01-16 PROCEDURE — 97016 VASOPNEUMATIC DEVICE THERAPY: CPT

## 2025-01-16 PROCEDURE — 97110 THERAPEUTIC EXERCISES: CPT

## 2025-01-16 PROCEDURE — 97140 MANUAL THERAPY 1/> REGIONS: CPT

## 2025-01-16 NOTE — PROGRESS NOTES
1: Patient to be independent and compliant with HEP.-progressing  Long Term Goal 2: Patient to have improved L knee ROM 0-120* for improved mobility.-progressing  Long Term Goal 3: Patient to have improved L LE strength >/=4+/5 grossly for improved stability.-progressing  Long Term Goal 4: Patient to be able to walk outdoors with no AD, no gait deviations and no increase in knee pain to return to PLOF.-progressing    Minutes Tracking:  Time In: 1045  Time Out: 1142  Minutes: 57  Timed Code Treatment Minutes: 56 Minutes      Erik Petit     Date: 1/16/2025

## 2025-01-21 ENCOUNTER — HOSPITAL ENCOUNTER (OUTPATIENT)
Dept: PHYSICAL THERAPY | Age: 65
Setting detail: THERAPIES SERIES
Discharge: HOME | End: 2025-01-21
Payer: COMMERCIAL

## 2025-01-21 PROCEDURE — 97110 THERAPEUTIC EXERCISES: CPT

## 2025-01-21 PROCEDURE — 97016 VASOPNEUMATIC DEVICE THERAPY: CPT

## 2025-01-21 PROCEDURE — 97140 MANUAL THERAPY 1/> REGIONS: CPT

## 2025-01-21 NOTE — PROGRESS NOTES
Phone: [phone]                 [facility]           Fax: [phone]                           Outpatient Physical Therapy                                                                            Daily Note    Patient: Hina Pollock : 1960  [facility] #: [id number]   Referring Physician: Shiv Tuttle MD  Date: 2025    Diagnosis: L knee OA, M17.12  Treatment Diagnosis: L knee pain    Onset Date: 10/03/24  PT Insurance Information: [employer]  Total # of Visits Approved: 50 Per Physician Order  Total # of Visits to Date: 46  No Show: 0  Canceled Appointment: 0    25 Plan of Care/Recert Due    Pre-Treatment Pain:  0/10  Subjective: Pt denies pain, states her tightness is about the same    Exercises:  Exercise 2: Sci Fit level 5 x 6 min, seat as close as tolerable--10 min today, L2.0  Exercise 3: Step stretches flex/ext 2x60\"  Exercise 7: Sit/stands 2x10, holding 8# weight, HS curl/march, HR and mini squat x20 ea, cone taps SL L LE x20--no cone taps or STS today  Exercise 9: FSU/LSU/SD 8\" 20x  Exercise 10: Star tracsingle leg flexion 3pl 5x15    Manual:  Other: Prone and supine flexion stretching    Modality:     Modality Flow Sheet:   Performed (X) Tx Modality   x Vasopneumatic Compression with Ice to alleviate pain and/or edema of          Assessment  Assessment: Pt repots withknee stiffness. Exercise completed followed by staic stretching in prone and supine positions. Knee flexion 112-113 deg. Strength 4+/5    Activity Tolerance  Activity Tolerance: Patient tolerated treatment well    Patient Education  Patient Education: HEP  Pt verbalized/demonstrated good understanding:     [x] Yes         [] No, pt required further clarification.       Post Treatment Pain:  0/10      Plan  Plan Frequency: 2  Plan weeks: 4       Goals  (Total # of Visits to Date: 46)      Short Term Goals  Time Frame for Short Term Goals: 3 weeks  Short Term Goal 1: Patient to initiate HEP for improved L knee ROM and

## 2025-01-23 ENCOUNTER — HOSPITAL ENCOUNTER (OUTPATIENT)
Dept: PHYSICAL THERAPY | Age: 65
Setting detail: THERAPIES SERIES
Discharge: HOME | End: 2025-01-23
Payer: COMMERCIAL

## 2025-01-23 PROCEDURE — 97110 THERAPEUTIC EXERCISES: CPT

## 2025-01-23 PROCEDURE — 97140 MANUAL THERAPY 1/> REGIONS: CPT

## 2025-01-23 PROCEDURE — 97016 VASOPNEUMATIC DEVICE THERAPY: CPT

## 2025-01-23 NOTE — PROGRESS NOTES
Phone: [phone]                 [facility]           Fax: [phone]                           Outpatient Physical Therapy                                                                            Daily Note    Patient: Hina Pollock : 1960  [facility] #: [id number]   Referring Physician: Shiv Tuttle MD  Date: 2025    Diagnosis: L knee OA, M17.12  Treatment Diagnosis: L knee pain    Onset Date: 10/03/24  Total # of Visits Approved: 50 Per Physician Order  Total # of Visits to Date: 47  No Show: 0  Canceled Appointment: 0    25 Plan of Care/Recert Due    Pre-Treatment Pain:  2/10  Subjective: Pt feels her tightness is decreasing and her ROM is improving    Exercises:  Exercise 3: Step stretches flex/ext 2x60\"  Exercise 7: Sit/stands 2x10, holding 8# weight, HS curl/march, HR and mini squat x20 ea, cone taps SL L LE x20--no cone taps or STS today  Exercise 9: FSU/LSU/SD 8\" 20x  Exercise 10: Star tracsingle leg flexion 3pl 5x15  Exercise 14: Cybex leg press 2x10, L SL 3 plates, 8 pl double    Manual:  Other: Prone and supine flexion stretching    Modality:     Modality Flow Sheet:   Performed (X) Tx Modality   x Vasopneumatic Compression with Ice to alleviate pain and/or edema of          Assessment  Assessment: Pt with no new complaints. Progresses leg press aned extension machine weights with good tolerance. Long static stretch in prone and supine position with 115 deg noted.    Activity Tolerance  Activity Tolerance: Patient tolerated treatment well    Patient Education  Patient Education: HEP  Pt verbalized/demonstrated good understanding:     [x] Yes         [] No, pt required further clarification.       Post Treatment Pain:  1/10      Plan  Plan Frequency: 2          Goals  (Total # of Visits to Date: 47)      Short Term Goals  Time Frame for Short Term Goals: 3 weeks  Short Term Goal 1: Patient to initiate HEP for improved L knee ROM and strength.-met  Short Term Goal 2: Patient to

## 2025-01-27 ENCOUNTER — HOSPITAL ENCOUNTER (OUTPATIENT)
Dept: PHYSICAL THERAPY | Age: 65
Setting detail: THERAPIES SERIES
Discharge: HOME | End: 2025-01-27
Payer: COMMERCIAL

## 2025-01-27 PROCEDURE — 97110 THERAPEUTIC EXERCISES: CPT

## 2025-01-27 PROCEDURE — 97016 VASOPNEUMATIC DEVICE THERAPY: CPT

## 2025-01-27 PROCEDURE — 97140 MANUAL THERAPY 1/> REGIONS: CPT

## 2025-01-27 NOTE — PROGRESS NOTES
Phone: [phone]                 [facility]           Fax: [phone]                           Outpatient Physical Therapy                                                                            Daily Note    Patient: Hina Pollock : 1960  CSN #: [id number]   Referring Physician: Shiv Tuttle MD  Date: 2025    Diagnosis: L knee OA, M17.12  Treatment Diagnosis: L knee pain    Onset Date: 10/03/24  PT Insurance Information: [employer]  Total # of Visits Approved: 50 Per Physician Order  Total # of Visits to Date: 48  No Show: 0  Canceled Appointment: 0    25 Plan of Care/Recert Due    Pre-Treatment Pain:  0/10  Subjective: Pt states she been doing pretty good, states she see's her Dr following todays session.    Exercises:  Exercise 2: Sci Fit level 5 x 12 min, seat as close as tolerable--10 min today, L2.0  Exercise 3: Step stretches flex/ext 2x60\"  Exercise 7: Sit/stands 2x10, holding 10# weight, HS curl/march, HR and mini squat x20 ea, cone taps SL L LE x20--no cone taps or STS today  Exercise 9: FSU/LSU/SD 9\" 20x  Exercise 10: Star trac single leg flexion 4pl 5x15    Manual:  Other: Prone and supine flexion stretching    Modality:     Modality Flow Sheet:   Performed (X) Tx Modality   x Vasopneumatic Compression with Ice to alleviate pain and/or edema of          Assessment  Assessment: Passive flexion 115-118 deg , extension -2 deg. Strength is doing well at 4+/5. Pt to see her Dr today. Game ready following session    Activity Tolerance  Activity Tolerance: Patient tolerated treatment well    Patient Education  Patient Education: HEP  Pt verbalized/demonstrated good understanding:     [x] Yes         [] No, pt required further clarification.       Post Treatment Pain:  0/10      Plan  Plan Frequency: 2  Plan weeks: 4       Goals  (Total # of Visits to Date: 48)      Short Term Goals  Time Frame for Short Term Goals: 3 weeks  Short Term Goal 1: Patient to initiate HEP for improved

## 2025-01-28 ENCOUNTER — APPOINTMENT (OUTPATIENT)
Dept: PHYSICAL THERAPY | Age: 65
End: 2025-01-28
Payer: COMMERCIAL

## 2025-01-30 ENCOUNTER — HOSPITAL ENCOUNTER (OUTPATIENT)
Dept: PHYSICAL THERAPY | Age: 65
Setting detail: THERAPIES SERIES
Discharge: HOME | End: 2025-01-30
Payer: COMMERCIAL

## 2025-01-30 PROCEDURE — 97110 THERAPEUTIC EXERCISES: CPT

## 2025-01-30 PROCEDURE — 97016 VASOPNEUMATIC DEVICE THERAPY: CPT

## 2025-01-30 NOTE — PROGRESS NOTES
0/10      Plan  Plan Frequency: 2  Plan weeks: 4       Goals  (Total # of Visits to Date: 49)      Short Term Goals  Time Frame for Short Term Goals: 3 weeks  Short Term Goal 1: Patient to initiate HEP for improved L knee ROM and strength.-met  Short Term Goal 2: Patient to have improved L knee ROM 5-90* for improved mobility.-met (3-112* on 1/2)  Short Term Goal 3: Patient to tolerate manual techniques/modalities to decrease L knee pain and edema and improve mobility.-met    Long Term Goals  Time Frame for Long Term Goals : 6 weeks  Long Term Goal 1: Patient to be independent and compliant with HEP.-met  Long Term Goal 2: Patient to have improved L knee ROM 0-120* for improved mobility.-partially met (1/30: 2-118*)  Long Term Goal 3: Patient to have improved L LE strength >/=4+/5 grossly for improved stability.-met  Long Term Goal 4: Patient to be able to walk outdoors with no AD, no gait deviations and no increase in knee pain to return to PLOF.-partially met (1/30: walks with no AD, no pain, minimal deviation d/t lack of L TKE)    Minutes Tracking:  Time In: 1012  Time Out: 1113  Minutes: 61  Timed Code Treatment Minutes: 60 Minutes      Sergio Atkinson, PT, DPT     Date: 1/30/2025

## 2025-01-30 NOTE — DISCHARGE SUMMARY
Phone: [phone]                 [facility]          Fax: [phone]                            Outpatient Physical Therapy                                                                    Discharge Summary    Patient: Hina Pollock  : 1960  [facility] #: [id number]   Referring Physician: Shiv Tuttle MD  Diagnosis: L knee OA, M17.12  Treatment Diagnosis: L knee pain      Date Treatment Initiated: 10/7/24  Date of Last Treatment: 25      PT Visit Information  Onset Date: 10/03/24  PT Insurance Information: [employer]  Total # of Visits Approved: 50  Total # of Visits to Date: 49  Plan of Care/Certification Expiration Date: 25  No Show: 0  Progress Note Due Date: 25  Canceled Appointment: 0  Referring Provider (secondary): Shiv Tuttle MD      Frequency/Duration  Plan Frequency: 2 times per week  Plan weeks: 4 weeks      Treatment Received  [x] HP/CP      [x] Electrical Stim   [x] Therapeutic Exercise      [x] Gait Training  [] Aquatics   [] Ultrasound         [x] Patient Education/HEP   [x] Manual Therapy  [] Traction    [x] Neuro-andry        [x] Soft Tissue Mobs            [x] Therapeutic Act  [] Iontophoresis    [x] Vasopneumatic compression with ice      [] Dry Needling    Assessment  Assessment: Patient has attended 49 PT visits s/p L TKA and met goals for independence with HEP, improved L LE strength, and partially met goals for improved ROM and gait. Pt with current ROM 2-120* with overpressure, and able to walk with no AD and no pain. Will d/c patient at this time d/t optimal function reached.       Goals  Short Term Goals  Time Frame for Short Term Goals: 3 weeks  Short Term Goal 1: Patient to initiate HEP for improved L knee ROM and strength.-met  Short Term Goal 2: Patient to have improved L knee ROM 5-90* for improved mobility.-met (3-112* on )  Short Term Goal 3: Patient to tolerate manual techniques/modalities to decrease L knee pain and edema and improve
